# Patient Record
Sex: MALE | Race: WHITE | NOT HISPANIC OR LATINO | URBAN - METROPOLITAN AREA
[De-identification: names, ages, dates, MRNs, and addresses within clinical notes are randomized per-mention and may not be internally consistent; named-entity substitution may affect disease eponyms.]

---

## 2020-09-17 ENCOUNTER — LAB OUTSIDE AN ENCOUNTER (OUTPATIENT)
Dept: URBAN - METROPOLITAN AREA CLINIC 6 | Facility: CLINIC | Age: 55
End: 2020-09-17

## 2020-09-17 ENCOUNTER — TELEPHONE ENCOUNTER (OUTPATIENT)
Dept: URBAN - METROPOLITAN AREA CLINIC 6 | Facility: CLINIC | Age: 55
End: 2020-09-17

## 2020-09-23 ENCOUNTER — OFFICE VISIT (OUTPATIENT)
Dept: URBAN - METROPOLITAN AREA TELEHEALTH 2 | Facility: TELEHEALTH | Age: 55
End: 2020-09-23

## 2020-10-06 ENCOUNTER — TELEPHONE ENCOUNTER (OUTPATIENT)
Dept: URBAN - METROPOLITAN AREA CLINIC 92 | Facility: CLINIC | Age: 55
End: 2020-10-06

## 2020-10-06 NOTE — HPI-TODAY'S VISIT:
Dear   Carlos Mario, Labs: 10-3 labs: wbc 6.7 hg 16.3 and plat 179.  Glu 191 elevated and maybe not fasting and show local providers.cr 0.89.  na 139. K 4.4 and cl 103 and co2 22.  Ca 9.5. alb 4.8 and tb 0.9 and alk 85 and ast 32 and alt 62 and iron sat 41% and ferritin 269.  Prior 2019 labs ast 27 and alt 51.  We look forward to visit to getting update on you as been a while since that last visit.  Thank you.  Dr Szymanski

## 2020-10-13 ENCOUNTER — TELEPHONE ENCOUNTER (OUTPATIENT)
Dept: URBAN - METROPOLITAN AREA CLINIC 92 | Facility: CLINIC | Age: 55
End: 2020-10-13

## 2020-10-18 ENCOUNTER — TELEPHONE ENCOUNTER (OUTPATIENT)
Dept: URBAN - METROPOLITAN AREA CLINIC 92 | Facility: CLINIC | Age: 55
End: 2020-10-18

## 2020-10-18 NOTE — HPI-TODAY'S VISIT:
The patient is a 55 year old /White male, who presents afte rlast visit Jan 2019 on referral from Carlos Villarreal MD, for a gastroenterology evaluation of cirrhosis complicatd by portal hypertension and splenomegaly . A copy of this document will be sent to the referring provider. He is c/o LUQ pain that is constant starting a month ago that goes up to 9 on the pain scale. He has a history of kidney stones and was evaluated for this and it showed stones but was negative for obstruction. It is aggravated when he moves, coughs or sneeze. 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  The patient relates no significant family or personal history of liver disease. He states no history of new medications or alcohol use. The patient reports a personal history of no other habits of significance.  Interval testing includes: CMP,CBC.     Labs 01/22/2019 :glu 119, creat 0.84, na 137, k 4.1, alb 4.8, carlos 2.2, t. bili 1.1, ALP 74, AST 27, ALT 51 (< 30),  wbc 7.3, hgb 16.9, plts 206,000  Labs 07/08/2018: ALT 28, AST 18, ALP 72,, t. bili 1.0  He had his last phlebotomy a month ago. This is arranged by Dr. Garcia. He will follow up in Feb/Mar 2019  CT scan 01/17/2019: there are nonobstructing left renal parenchymal calculi with kidneys otherwise unremarkable, the appearance of the liver suggests diffuse fatty infiltration without focal lesion, spleen 18 x 12.0 x 7.5cm and is otherwise unrematkable, pancreas unremarkable  Encounter info: [id number], [facility], Single Visit OP, 11/3/2018 - 11/3/2018  * Final Report *  Reason For Exam *histo* abnormal liver  REPORT EXAM: MRI Abdomen w/ + w/o Contrast  CLINICAL INDICATION: *histo* abnormal liver.  TECHNIQUE: Multisequence, multiplanar MRI of the abdomen was performed without and with intravenous contrast. Written informed consent was obtained for the use of intravenous contrast.  CONTRAST: 23 cc of Prohance  COMPARISON: MR abdomen 3/10/2018  FINDINGS:  Lower Thorax: Limited images through the lung bases are unremarkable.  Liver: Morphologic changes of chronic liver disease with relative hypertrophy of the left hepatic lobe and mild surface nodularity, similar to prior. Computed fat percentage in the liver of 10.9%. No suspicious arterially enhancing lesion in the liver with washout to indicate hepatocellular carcinoma.  Gallbladder/Biliary Tree: Gallbladder not identified. No biliary ductal dilatation.  Spleen: Spleen measures 19.5 cm in length and is therefore enlarged, similar to prior.  Pancreas: No dilatation of the main pancreatic duct. Pancreatic parenchymal signal intensity is within normal limits. No suspicious focal lesion in the pancreas.  Adrenal Glands: Normal.   Kidneys/Ureters: No hydronephrosis bilaterally. No suspicious focal lesion in either kidney. The patient has a few small renal cysts measuring for example 1.0 cm upper pole right kidney series 9 image 34 and measuring 0.5 cm interpolar left kidney series 26 image 51.  Gastrointestinal: No evidence of bowel obstruction in the abdomen. Pelvic bowel loops not fully included in the field-of-view.   Lymph Nodes: No concerning lymph nodes are seen.  Vessels: Celiac trunk and superior mesenteric artery are patent. Main portal vein is patent. Mildly prominent collateral vessels are identified in the upper abdomen anteriorly.  Peritoneum/Retroperitoneum: No free fluid.  Bones/Soft Tissues: No concerning osseous lesions are seen.  IMPRESSION:      1. Similar to prior morphologic changes of chronic liver disease. No findings of hepatocellular cancer. Recommend continued 6 month MR surveillance.  2. Similar to prior sequelae of portal venous hypertension including 19 cm splenomegaly. Main portal vein is patent. No intra-abdominal ascites.   Signature Line *** Final ***  Electronically Signed By:  Mena Burnette on  11/04/2018 11:02  Dr. Szymanski reviewed his CT scan MRI and labs. He is concerned that there is an increase in the ALT. He is not sure if there is an increase of the ferritin causing it or if this is due to weight gain. He wants him to have the ferritin checked today and have a MRI to asssess the spleen and the LUQ pain. He is up 4 pounds since his last visit.  Duration of the visit was min office visit with > 50% of the time spent on coordination of care

## 2020-10-22 ENCOUNTER — OFFICE VISIT (OUTPATIENT)
Dept: URBAN - METROPOLITAN AREA CLINIC 91 | Facility: CLINIC | Age: 55
End: 2020-10-22
Payer: COMMERCIAL

## 2020-10-22 DIAGNOSIS — K75.81 NASH WITH CIRRHOSIS: ICD-10-CM

## 2020-10-22 PROCEDURE — 93975 VASCULAR STUDY: CPT

## 2020-10-22 PROCEDURE — 76705 ECHO EXAM OF ABDOMEN: CPT

## 2020-10-22 RX ORDER — IBUPROFEN 100 MG/1
TAKE 2 TABLETS (200 MG) BY ORAL ROUTE EVERY 6 HOURS AS NEEDED WITH FOOD TABLET, COATED ORAL
Qty: 0 | Refills: 0 | Status: ACTIVE | COMMUNITY
Start: 1900-01-01 | End: 1900-01-01

## 2020-10-22 RX ORDER — GABAPENTIN 300 MG/1
TAKE 1 CAPSULE (300 MG) BY ORAL ROUTE 3 TIMES PER DAY CAPSULE ORAL
Qty: 0 | Refills: 0 | Status: ACTIVE | COMMUNITY
Start: 1900-01-01 | End: 1900-01-01

## 2020-10-22 RX ORDER — METFORMIN HYDROCHLORIDE 1000 MG/1
TAKE 1 TABLET (1,000 MG) BY ORAL ROUTE 2 TIMES PER DAY WITH MORNING AND EVENING MEALS TABLET, COATED ORAL 2
Qty: 0 | Refills: 0 | Status: ACTIVE | COMMUNITY
Start: 1900-01-01 | End: 1900-01-01

## 2020-10-23 ENCOUNTER — TELEPHONE ENCOUNTER (OUTPATIENT)
Dept: URBAN - METROPOLITAN AREA CLINIC 92 | Facility: CLINIC | Age: 55
End: 2020-10-23

## 2020-10-23 NOTE — HPI-OTHER HISTORIES
Dear Mr Mario,  Oct u/s: changes of cirrhosis, and stigmata of portal hypertension seen. No focal mass in liver. Liver appears to also be fatty to them possibly. Doppler of vessels patent. Partialy visualized pancreas unremarkable.  No bile duct dilation. Gallbladder absent and common bile duct normal at 5mm. Right kidney 11cm with no hydronephrosis. Spleen enlarged 16.3cm. No ascites/fluid. Prior 2019 scan liver fatty and nodular and spleen 19.1cm. Liver and renal cysts seen better on mri than u.s.  Will  review with you at visit and discuss options.   Thanks  Dr Szymanski

## 2020-10-28 ENCOUNTER — OFFICE VISIT (OUTPATIENT)
Dept: URBAN - METROPOLITAN AREA TELEHEALTH 2 | Facility: TELEHEALTH | Age: 55
End: 2020-10-28

## 2020-12-18 ENCOUNTER — OFFICE VISIT (OUTPATIENT)
Dept: URBAN - METROPOLITAN AREA TELEHEALTH 2 | Facility: TELEHEALTH | Age: 55
End: 2020-12-18
Payer: COMMERCIAL

## 2020-12-18 ENCOUNTER — LAB OUTSIDE AN ENCOUNTER (OUTPATIENT)
Dept: URBAN - METROPOLITAN AREA TELEHEALTH 2 | Facility: TELEHEALTH | Age: 55
End: 2020-12-18

## 2020-12-18 DIAGNOSIS — Z71.6 TOBACCO ABUSE COUNSELING: ICD-10-CM

## 2020-12-18 DIAGNOSIS — K75.81 NASH (NONALCOHOLIC STEATOHEPATITIS): ICD-10-CM

## 2020-12-18 DIAGNOSIS — R16.0 HEPATOMEGALY: ICD-10-CM

## 2020-12-18 DIAGNOSIS — E66.3 OVERWEIGHT: ICD-10-CM

## 2020-12-18 PROCEDURE — 99214 OFFICE O/P EST MOD 30 MIN: CPT

## 2020-12-18 PROCEDURE — G9906 PT RECV TBCO CESS INTERV: HCPCS

## 2020-12-18 PROCEDURE — G9902 PT SCRN TBCO AND ID AS USER: HCPCS

## 2020-12-18 PROCEDURE — 3017F COLORECTAL CA SCREEN DOC REV: CPT

## 2020-12-18 PROCEDURE — G8427 DOCREV CUR MEDS BY ELIG CLIN: HCPCS

## 2020-12-18 PROCEDURE — G8482 FLU IMMUNIZE ORDER/ADMIN: HCPCS

## 2020-12-18 PROCEDURE — G8417 CALC BMI ABV UP PARAM F/U: HCPCS

## 2020-12-18 PROCEDURE — 4004F PT TOBACCO SCREEN RCVD TLK: CPT

## 2020-12-18 RX ORDER — GABAPENTIN 300 MG/1
TAKE 1 CAPSULE (300 MG) BY ORAL ROUTE 3 TIMES PER DAY CAPSULE ORAL ONCE A DAY
Refills: 0 | Status: ACTIVE | COMMUNITY
Start: 1900-01-01

## 2020-12-18 RX ORDER — IBUPROFEN 100 MG/1
TAKE 2 TABLETS (200 MG) BY ORAL ROUTE EVERY 6 HOURS AS NEEDED WITH FOOD TABLET, COATED ORAL
Qty: 0 | Refills: 0 | Status: DISCONTINUED | COMMUNITY
Start: 1900-01-01

## 2020-12-18 RX ORDER — METFORMIN HYDROCHLORIDE 1000 MG/1
TAKE 1 TABLET (1,000 MG) BY ORAL ROUTE 2 TIMES PER DAY WITH MORNING AND EVENING MEALS TABLET, COATED ORAL 2
Qty: 0 | Refills: 0 | Status: ACTIVE | COMMUNITY
Start: 1900-01-01

## 2020-12-18 NOTE — HPI-TODAY'S VISIT:
The patient is a 55 year old /White male, who presents after last visit since 2019 on referral from Carlos Villarreal MD, for a gastroenterology evaluation of cirrhosis complicatd by portal hypertension and splenomegaly.   A copy of this document will be sent to the referring provider.  Prior maira a hx of LUQ pain that is constant starting in the past that goes up to 9 on the pain scale and that has gone away and not clear and he is a  and may have pulled a muscles.  He has a history of kidney stones and that has not been an issue an none x 3-4 yrs.  Oct 2020 u/s: changes of cirrhosis, and stigmata of portal hypertension seen. No focal mass in liver. Liver appears to also be fatty to them possibly. Doppler of vessels patent. Partialy visualized pancreas unremarkable.  No bile duct dilation. Gallbladder absent and common bile duct normal at 5mm. Right kidney 11cm with no hydronephrosis. Spleen enlarged 16.3cm. No ascites/fluid. Prior 2019 scan liver fatty and nodular and spleen 19.1cm. Liver and renal cysts seen better on mri than u.s.    Scans every 6m to look for cancer.   Labs: 10-3 2020  labs: wbc 6.7 hg 16.3 and plat 179.  Glu 191 elevated and maybe not fasting and show local providers.cr 0.89.  na 139. K 4.4 and cl 103 and co2 22.  Ca 9.5. alb 4.8 and tb 0.9 and alk 85 and ast 32 and alt 62  (ideal alt <35)  and iron sat 41% and ferritin 269.   Prior 2019 labs ast 27 and alt 51.   He is having sugar issues and he says that has been tough with the pandemic.  1/22/2019 :glu 119, creat 0.84, na 137, k 4.1, alb 4.8, carlos 2.2, t. bili 1.1, ALP 74, AST 27, ALT 51 (< 30),  wbc 7.3, hgb 16.9, plts 206,000  7/08/2018: ALT 28, AST 18, ALP 72, t. bili 1.0  he has gained some weight 5-10 pounds.  He had his last phlebotomy  about every 6 months or so.  I called Dr Garcia when the mri issue came up.  Dr. Garcia. He will follow up in Feb/Mar 2019.  CT scan 01/17/2019: there are nonobstructing left renal parenchymal calculi with kidneys otherwise unremarkable, the appearance of the liver suggests diffuse fatty infiltration without focal lesion, spleen 18 x 12.0 x 7.5cm and is otherwise unrematkable, pancreas unremarkable  Encounter info: [id number], [facility], Single Visit OP, 11/3/2018 - 11/3/2018  * Final Report *  Reason For Exam *histo* abnormal liver  REPORT EXAM: MRI Abdomen w/ + w/o Contrast  CLINICAL INDICATION: *histo* abnormal liver.  TECHNIQUE: Multisequence, multiplanar MRI of the abdomen was performed without and with intravenous contrast. Written informed consent was obtained for the use of intravenous contrast.  CONTRAST: 23 cc of Prohance  COMPARISON: MR abdomen 3/10/2018  FINDINGS:  Lower Thorax: Limited images through the lung bases are unremarkable.  Liver: Morphologic changes of chronic liver disease with relative hypertrophy of the left hepatic lobe and mild surface nodularity, similar to prior. Computed fat percentage in the liver of 10.9%. No suspicious arterially enhancing lesion in the liver with washout to indicate hepatocellular carcinoma.  Gallbladder/Biliary Tree: Gallbladder not identified. No biliary ductal dilatation.  Spleen: Spleen measures 19.5 cm in length and is therefore enlarged, similar to prior.  Pancreas: No dilatation of the main pancreatic duct. Pancreatic parenchymal signal intensity is within normal limits. No suspicious focal lesion in the pancreas.  Adrenal Glands: Normal.   Kidneys/Ureters: No hydronephrosis bilaterally. No suspicious focal lesion in either kidney. The patient has a few small renal cysts measuring for example 1.0 cm upper pole right kidney series 9 image 34 and measuring 0.5 cm interpolar left kidney series 26 image 51.  Gastrointestinal: No evidence of bowel obstruction in the abdomen. Pelvic bowel loops not fully included in the field-of-view.   Lymph Nodes: No concerning lymph nodes are seen.  Vessels: Celiac trunk and superior mesenteric artery are patent. Main portal vein is patent. Mildly prominent collateral vessels are identified in the upper abdomen anteriorly.  Peritoneum/Retroperitoneum: No free fluid.  Bones/Soft Tissues: No concerning osseous lesions are seen.  IMPRESSION:      1. Similar to prior morphologic changes of chronic liver disease. No findings of hepatocellular cancer. Recommend continued 6 month MR surveillance.  2. Similar to prior sequelae of portal venous hypertension including 19 cm splenomegaly. Main portal vein is patent. No intra-abdominal ascites.   Signature Line *** Final ***  Electronically Signed By:  Mena Burnette on  11/04/2018 11:02  Plan: 1. Have him work on sugars and weight, 2. Resee us in May and do the labs and the u,s in April. 3. He lives close to [place]  and do the labs and the u.s there.  Stressed to pt the need for social distancing and strict handwashing and wearing a mask and to follow any other new or added CDC recommendations as this is an evolving target.  Duration of the visit was 33 min via [employer] for this office visit with greater than 50% of the time spent on coordination of care  with the pt.

## 2020-12-18 NOTE — EXAM-PHYSICAL EXAM
Telemed self reported:  Gen: no distress. Eyes: no jaundice. Mouth: no thrush. CV: no chest pain. Resp: no wheezes. Abd: no pain. Ext: no edema.	 Neuro: no weakness.

## 2021-01-04 ENCOUNTER — OFFICE VISIT (OUTPATIENT)
Dept: URBAN - METROPOLITAN AREA TELEHEALTH 2 | Facility: TELEHEALTH | Age: 56
End: 2021-01-04

## 2021-01-04 NOTE — HPI-TODAY'S VISIT:
The patient is a 55 year old /White male, who presents after last visit dec 2020  on referral from Carlos Villarreal MD, for a gastroenterology evaluation of cirrhosis complicatd by portal hypertension and splenomegaly.   A copy of this document will be sent to the referring provider.  Prior maira a hx of LUQ pain that is constant starting in the past that goes up to 9 on the pain scale and that has gone away and not clear and he is a  and may have pulled a muscles.  He has a history of kidney stones and that has not been an issue an none x 3-4 yrs.  Oct 2020 u/s: changes of cirrhosis, and stigmata of portal hypertension seen. No focal mass in liver. Liver appears to also be fatty to them possibly. Doppler of vessels patent. Partialy visualized pancreas unremarkable.  No bile duct dilation. Gallbladder absent and common bile duct normal at 5mm. Right kidney 11cm with no hydronephrosis. Spleen enlarged 16.3cm. No ascites/fluid. Prior 2019 scan liver fatty and nodular and spleen 19.1cm. Liver and renal cysts seen better on mri than u.s.    Scans every 6m to look for cancer.   Labs: 10-3 2020  labs: wbc 6.7 hg 16.3 and plat 179.  Glu 191 elevated and maybe not fasting and show local providers.cr 0.89.  na 139. K 4.4 and cl 103 and co2 22.  Ca 9.5. alb 4.8 and tb 0.9 and alk 85 and ast 32 and alt 62  (ideal alt <35)  and iron sat 41% and ferritin 269.   Prior 2019 labs ast 27 and alt 51.   He is having sugar issues and he says that has been tough with the pandemic.  1/22/2019 :glu 119, creat 0.84, na 137, k 4.1, alb 4.8, carlos 2.2, t. bili 1.1, ALP 74, AST 27, ALT 51 (< 30),  wbc 7.3, hgb 16.9, plts 206,000  7/08/2018: ALT 28, AST 18, ALP 72, t. bili 1.0  he has gained some weight 5-10 pounds.  He had his last phlebotomy  about every 6 months or so.  I called Dr Garcia when the mri issue came up.  Dr. Garcia. He will follow up in Feb/Mar 2019.  CT scan 01/17/2019: there are nonobstructing left renal parenchymal calculi with kidneys otherwise unremarkable, the appearance of the liver suggests diffuse fatty infiltration without focal lesion, spleen 18 x 12.0 x 7.5cm and is otherwise unrematkable, pancreas unremarkable  Encounter info: [id number], [facility], Single Visit OP, 11/3/2018 - 11/3/2018  * Final Report *  Reason For Exam *histo* abnormal liver  REPORT EXAM: MRI Abdomen w/ + w/o Contrast  CLINICAL INDICATION: *histo* abnormal liver.  TECHNIQUE: Multisequence, multiplanar MRI of the abdomen was performed without and with intravenous contrast. Written informed consent was obtained for the use of intravenous contrast.  CONTRAST: 23 cc of Prohance  COMPARISON: MR abdomen 3/10/2018  FINDINGS:  Lower Thorax: Limited images through the lung bases are unremarkable.  Liver: Morphologic changes of chronic liver disease with relative hypertrophy of the left hepatic lobe and mild surface nodularity, similar to prior. Computed fat percentage in the liver of 10.9%. No suspicious arterially enhancing lesion in the liver with washout to indicate hepatocellular carcinoma.  Gallbladder/Biliary Tree: Gallbladder not identified. No biliary ductal dilatation.  Spleen: Spleen measures 19.5 cm in length and is therefore enlarged, similar to prior.  Pancreas: No dilatation of the main pancreatic duct. Pancreatic parenchymal signal intensity is within normal limits. No suspicious focal lesion in the pancreas.  Adrenal Glands: Normal.   Kidneys/Ureters: No hydronephrosis bilaterally. No suspicious focal lesion in either kidney. The patient has a few small renal cysts measuring for example 1.0 cm upper pole right kidney series 9 image 34 and measuring 0.5 cm interpolar left kidney series 26 image 51.  Gastrointestinal: No evidence of bowel obstruction in the abdomen. Pelvic bowel loops not fully included in the field-of-view.   Lymph Nodes: No concerning lymph nodes are seen.  Vessels: Celiac trunk and superior mesenteric artery are patent. Main portal vein is patent. Mildly prominent collateral vessels are identified in the upper abdomen anteriorly.  Peritoneum/Retroperitoneum: No free fluid.  Bones/Soft Tissues: No concerning osseous lesions are seen.  IMPRESSION:      1. Similar to prior morphologic changes of chronic liver disease. No findings of hepatocellular cancer. Recommend continued 6 month MR surveillance.  2. Similar to prior sequelae of portal venous hypertension including 19 cm splenomegaly. Main portal vein is patent. No intra-abdominal ascites.   Signature Line *** Final ***  Electronically Signed By:  Mena Burnette on  11/04/2018 11:02  Plan: 1. Have him work on sugars and weight, 2. Resee us in May and do the labs and the u,s in April. 3. He lives close to [place]  and do the labs and the u.s there.  Stressed to pt the need for social distancing and strict handwashing and wearing a mask and to follow any other new or added CDC recommendations as this is an evolving target.  Duration of the visit was  min via [employer] for this office visit with greater than 50% of the time spent on coordination of care  with the pt.

## 2021-02-18 ENCOUNTER — TELEPHONE ENCOUNTER (OUTPATIENT)
Dept: URBAN - METROPOLITAN AREA CLINIC 6 | Facility: CLINIC | Age: 56
End: 2021-02-18

## 2021-03-16 ENCOUNTER — APPOINTMENT (RX ONLY)
Dept: URBAN - METROPOLITAN AREA CLINIC 38 | Facility: CLINIC | Age: 56
Setting detail: DERMATOLOGY
End: 2021-03-16

## 2021-03-16 ENCOUNTER — APPOINTMENT (RX ONLY)
Dept: URBAN - METROPOLITAN AREA CLINIC 37 | Facility: CLINIC | Age: 56
Setting detail: DERMATOLOGY
End: 2021-03-16

## 2021-03-16 DIAGNOSIS — R23.3 SPONTANEOUS ECCHYMOSES: ICD-10-CM

## 2021-03-16 DIAGNOSIS — L81.4 OTHER MELANIN HYPERPIGMENTATION: ICD-10-CM

## 2021-03-16 DIAGNOSIS — D22 MELANOCYTIC NEVI: ICD-10-CM

## 2021-03-16 DIAGNOSIS — L72.0 EPIDERMAL CYST: ICD-10-CM

## 2021-03-16 DIAGNOSIS — D18.0 HEMANGIOMA: ICD-10-CM

## 2021-03-16 DIAGNOSIS — L30.0 NUMMULAR DERMATITIS: ICD-10-CM

## 2021-03-16 PROBLEM — D22.5 MELANOCYTIC NEVI OF TRUNK: Status: ACTIVE | Noted: 2021-03-16

## 2021-03-16 PROBLEM — D18.01 HEMANGIOMA OF SKIN AND SUBCUTANEOUS TISSUE: Status: ACTIVE | Noted: 2021-03-16

## 2021-03-16 PROCEDURE — 99203 OFFICE O/P NEW LOW 30 MIN: CPT

## 2021-03-16 PROCEDURE — ? COUNSELING

## 2021-03-16 PROCEDURE — ? TREATMENT REGIMEN

## 2021-03-16 PROCEDURE — ? PRESCRIPTION

## 2021-03-16 RX ORDER — MOMETASONE FUROATE 1 MG/G
ONCE CREAM TOPICAL DAILY
Qty: 1 | Refills: 1 | Status: ERX | COMMUNITY
Start: 2021-03-16

## 2021-03-16 RX ADMIN — MOMETASONE FUROATE ONCE: 1 CREAM TOPICAL at 00:00

## 2021-03-16 ASSESSMENT — LOCATION DETAILED DESCRIPTION DERM
LOCATION DETAILED: LEFT CLAVICULAR NECK
LOCATION DETAILED: RIGHT LATERAL SUPERIOR CHEST
LOCATION DETAILED: RIGHT LATERAL SUPERIOR CHEST
LOCATION DETAILED: RIGHT VENTRAL PROXIMAL FOREARM
LOCATION DETAILED: RIGHT PROXIMAL PRETIBIAL REGION
LOCATION DETAILED: RIGHT SUPERIOR MEDIAL UPPER BACK
LOCATION DETAILED: LEFT PROXIMAL PRETIBIAL REGION
LOCATION DETAILED: RIGHT SUPERIOR MEDIAL UPPER BACK
LOCATION DETAILED: LEFT LATERAL ABDOMEN
LOCATION DETAILED: LEFT CLAVICULAR NECK
LOCATION DETAILED: LEFT VENTRAL PROXIMAL FOREARM
LOCATION DETAILED: LEFT VENTRAL PROXIMAL FOREARM
LOCATION DETAILED: RIGHT VENTRAL PROXIMAL FOREARM
LOCATION DETAILED: LEFT LATERAL ABDOMEN
LOCATION DETAILED: RIGHT PROXIMAL PRETIBIAL REGION
LOCATION DETAILED: LEFT PROXIMAL PRETIBIAL REGION

## 2021-03-16 ASSESSMENT — LOCATION SIMPLE DESCRIPTION DERM
LOCATION SIMPLE: LEFT ANTERIOR NECK
LOCATION SIMPLE: CHEST
LOCATION SIMPLE: LEFT PRETIBIAL REGION
LOCATION SIMPLE: RIGHT FOREARM
LOCATION SIMPLE: RIGHT UPPER BACK
LOCATION SIMPLE: LEFT PRETIBIAL REGION
LOCATION SIMPLE: ABDOMEN
LOCATION SIMPLE: LEFT FOREARM
LOCATION SIMPLE: LEFT FOREARM
LOCATION SIMPLE: ABDOMEN
LOCATION SIMPLE: RIGHT PRETIBIAL REGION
LOCATION SIMPLE: LEFT ANTERIOR NECK
LOCATION SIMPLE: RIGHT UPPER BACK
LOCATION SIMPLE: CHEST
LOCATION SIMPLE: RIGHT PRETIBIAL REGION
LOCATION SIMPLE: RIGHT FOREARM

## 2021-03-16 ASSESSMENT — LOCATION ZONE DERM
LOCATION ZONE: ARM
LOCATION ZONE: TRUNK
LOCATION ZONE: LEG
LOCATION ZONE: ARM
LOCATION ZONE: LEG
LOCATION ZONE: NECK
LOCATION ZONE: TRUNK
LOCATION ZONE: NECK

## 2021-03-16 NOTE — PROCEDURE: TREATMENT REGIMEN
Samples Given: Eucerin advanced repair lotion
Plan: Discussed with pt he should be seeing results with a couple of weeks of treatment with the MOMETASONE cream, advised pt  to moisturize daily with eucerin lotion
Initiate Treatment: MOMETASONE cream apply once nightly
Detail Level: Generalized
Otc Regimen: Eucerin advanced repair cream

## 2021-03-16 NOTE — PROCEDURE: TREATMENT REGIMEN
Otc Regimen: Eucerin advanced repair cream
Detail Level: Generalized
Plan: Discussed with pt he should be seeing results with a couple of weeks of treatment with the MOMETASONE cream, advised pt  to moisturize daily with eucerin lotion
Samples Given: Eucerin advanced repair lotion
Initiate Treatment: MOMETASONE cream apply once nightly

## 2021-04-01 ENCOUNTER — LAB OUTSIDE AN ENCOUNTER (OUTPATIENT)
Dept: URBAN - METROPOLITAN AREA TELEHEALTH 2 | Facility: TELEHEALTH | Age: 56
End: 2021-04-01

## 2021-04-03 ENCOUNTER — TELEPHONE ENCOUNTER (OUTPATIENT)
Dept: URBAN - METROPOLITAN AREA CLINIC 92 | Facility: CLINIC | Age: 56
End: 2021-04-03

## 2021-04-03 LAB
A/G RATIO: 1.7
ALBUMIN: 4.5
ALKALINE PHOSPHATASE: 90
ALT (SGPT): 46
AST (SGOT): 24
BASO (ABSOLUTE): 0.1
BASOS: 1
BILIRUBIN, TOTAL: 1
BUN/CREATININE RATIO: 14
BUN: 13
CALCIUM: 10.2
CARBON DIOXIDE, TOTAL: 22
CHLORIDE: 103
CREATININE: 0.96
EGFR IF AFRICN AM: 101
EGFR IF NONAFRICN AM: 87
EOS (ABSOLUTE): 0.4
EOS: 4
FERRITIN, SERUM: 186
GLOBULIN, TOTAL: 2.6
GLUCOSE: 135
HEMATOCRIT: 45.5
HEMATOLOGY COMMENTS:: (no result)
HEMOGLOBIN: 15.6
IMMATURE CELLS: (no result)
IMMATURE GRANS (ABS): 0
IMMATURE GRANULOCYTES: 0
INR: 1.1
IRON BIND.CAP.(TIBC): 252
IRON SATURATION: 38
IRON: 96
LYMPHS (ABSOLUTE): 1.7
LYMPHS: 18
MCH: 32.4
MCHC: 34.3
MCV: 95
MONOCYTES(ABSOLUTE): 0.6
MONOCYTES: 7
NEUTROPHILS (ABSOLUTE): 6.4
NEUTROPHILS: 70
NRBC: (no result)
PLATELETS: 202
POTASSIUM: 4
PROTEIN, TOTAL: 7.1
PROTHROMBIN TIME: 11.6
RBC: 4.81
RDW: 12.8
SODIUM: 140
UIBC: 156
WBC: 9.2

## 2021-04-03 NOTE — HPI-TODAY'S VISIT:
April 2 labs showed INR 1.1 iron saturation normal at 38%.  Ferritin 186. Both down from prior labs. Sodium 140 potassium 4.0 chloride 103 CO2 22 albumin 4.5 bilirubin 1.0 alkaline phosphatase 90 AST 24 and ALT 46.  Ideal ALT would be less than 35.  Glucose elevated 135 and show to primary MD.  BUN 13.  Creatinine 0.96.  White blood cell count 9.2 hemoglobin 15.6 platelet count 202 and MCV 95 neutrophils noted to be normal at 6.4.  Per notes from last visit October 2020 labs showed hemoglobin 16.3 platelet count 179 AST 32 and ALT 62 iron sat was 41% and ferritin 269.  Suspect he may have had a phlebotomy done since that last time frame. Meld 7 and meld na 7. Please share labs with local providers.

## 2021-04-08 ENCOUNTER — OFFICE VISIT (OUTPATIENT)
Dept: URBAN - METROPOLITAN AREA CLINIC 91 | Facility: CLINIC | Age: 56
End: 2021-04-08
Payer: COMMERCIAL

## 2021-04-08 DIAGNOSIS — R16.1 SPLENOMEGALY: ICD-10-CM

## 2021-04-08 DIAGNOSIS — K76.0 FATTY LIVER: ICD-10-CM

## 2021-04-08 PROCEDURE — 76705 ECHO EXAM OF ABDOMEN: CPT

## 2021-04-08 PROCEDURE — 93975 VASCULAR STUDY: CPT

## 2021-04-08 RX ORDER — METFORMIN HYDROCHLORIDE 1000 MG/1
TAKE 1 TABLET (1,000 MG) BY ORAL ROUTE 2 TIMES PER DAY WITH MORNING AND EVENING MEALS TABLET, COATED ORAL 2
Qty: 0 | Refills: 0 | Status: ACTIVE | COMMUNITY
Start: 1900-01-01

## 2021-04-08 RX ORDER — GABAPENTIN 300 MG/1
TAKE 1 CAPSULE (300 MG) BY ORAL ROUTE 3 TIMES PER DAY CAPSULE ORAL ONCE A DAY
Refills: 0 | Status: ACTIVE | COMMUNITY
Start: 1900-01-01

## 2021-04-10 ENCOUNTER — TELEPHONE ENCOUNTER (OUTPATIENT)
Dept: URBAN - METROPOLITAN AREA CLINIC 92 | Facility: CLINIC | Age: 56
End: 2021-04-10

## 2021-04-10 NOTE — HPI-TODAY'S VISIT:
Jing Mario, April 8 ultrasound shows nonvisualization of the gallbladder consistent with prior surgery.  Common bile duct normal at 4 mm.  Liver did appear to be diffusely increased in echogenicity and consistent with a possible fatty infiltration.  No focal abnormality was seen.  Imaged pancreas was unremarkable.  Right kidney 11.5 cm with no hydronephrosis.  Spleen was enlarged at 15 cm.  Liver vessels were patent.  Dr Szymanski

## 2021-04-19 ENCOUNTER — LAB OUTSIDE AN ENCOUNTER (OUTPATIENT)
Dept: URBAN - METROPOLITAN AREA TELEHEALTH 2 | Facility: TELEHEALTH | Age: 56
End: 2021-04-19

## 2021-04-19 ENCOUNTER — OFFICE VISIT (OUTPATIENT)
Dept: URBAN - METROPOLITAN AREA TELEHEALTH 2 | Facility: TELEHEALTH | Age: 56
End: 2021-04-19
Payer: COMMERCIAL

## 2021-04-19 DIAGNOSIS — E83.110 HEREDITARY HEMOCHROMATOSIS: ICD-10-CM

## 2021-04-19 DIAGNOSIS — R93.2 ABNORMAL LIVER DIAGNOSTIC IMAGING: ICD-10-CM

## 2021-04-19 DIAGNOSIS — K74.69 OTHER CIRRHOSIS OF LIVER: ICD-10-CM

## 2021-04-19 DIAGNOSIS — K75.81 NASH (NONALCOHOLIC STEATOHEPATITIS): ICD-10-CM

## 2021-04-19 PROCEDURE — 99214 OFFICE O/P EST MOD 30 MIN: CPT

## 2021-04-19 RX ORDER — METFORMIN HYDROCHLORIDE 1000 MG/1
TAKE 1 TABLET (1,000 MG) BY ORAL ROUTE 2 TIMES PER DAY WITH MORNING AND EVENING MEALS TABLET, COATED ORAL 2
Qty: 0 | Refills: 0 | Status: ACTIVE | COMMUNITY
Start: 1900-01-01

## 2021-04-19 RX ORDER — GABAPENTIN 300 MG/1
TAKE 1 CAPSULE (300 MG) BY ORAL ROUTE 3 TIMES PER DAY CAPSULE ORAL ONCE A DAY
Refills: 0 | Status: ACTIVE | COMMUNITY
Start: 1900-01-01

## 2021-04-19 NOTE — HPI-TODAY'S VISIT:
The patient is a 55 year old /White male, who presents after last visit dec 2020  on referral from Carlos Villarreal MD, after prior visit jan 2021 for a gastroenterology evaluation of cirrhosis complicatd by portal hypertension and splenomegaly.   A copy of this document will be sent to the referring provider.  They little outside [place] and they were ok with the storms.  April 8 2021 ultrasound shows nonvisualization of the gallbladder consistent with prior surgery.  Common bile duct normal at 4 mm.  Liver did appear to be diffusely increased in echogenicity and consistent with a possible fatty infiltration.  No focal abnormality was seen.  Imaged pancreas was unremarkable.  Right kidney 11.5 cm with no hydronephrosis.  Spleen was enlarged at 15 cm.  Liver vessels were patent.  April 2 labs showed INR 1.1 iron saturation normal at 38%.  Ferritin 186. Both down from prior labs. Sodium 140 potassium 4.0 chloride 103 CO2 22 albumin 4.5 bilirubin 1.0 alkaline phosphatase 90 AST 24 and ALT 46.  Ideal ALT would be less than 35.  Glucose elevated 135 and show to primary MD.  BUN 13.  Creatinine 0.96.  White blood cell count 9.2 hemoglobin 15.6 platelet count 202 and MCV 95 neutrophils noted to be normal at 6.4.  Per notes from last visit October 2020 labs showed hemoglobin 16.3 platelet count 179 AST 32 and ALT 62 iron sat was 41% and ferritin 269.  Suspect he may have had a phlebotomy done since that last time frame. Meld 7 and meld na 7.   Prior maira a hx of LUQ pain that was prior a constant pain and this has been gone now doing mor exercises and nearly quit smoking.   He is a  and may have pulled a muscles and lot of issues due to that and so deal with that.  He has a history of kidney stones and that has not been an issue an none x 3-4 yrs.  Oct 2020 u/s: changes of cirrhosis, and stigmata of portal hypertension seen. No focal mass in liver. Liver appears to also be fatty to them possibly. Doppler of vessels patent. Partialy visualized pancreas unremarkable.  No bile duct dilation. Gallbladder absent and common bile duct normal at 5mm. Right kidney 11cm with no hydronephrosis. Spleen enlarged 16.3cm. No ascites/fluid.  Prior 2019 scan liver fatty and nodular and spleen 19.1cm. Liver and renal cysts seen better on mri than u.s.    Scans every 6m to look for cancer.  He has been losing weight and trying to eat better. 4-5 pounds.   Labs: 10-3 2020  labs: wbc 6.7 hg 16.3 and plat 179.  Glu 191 elevated and maybe not fasting and show local providers.cr 0.89.  na 139. K 4.4 and cl 103 and co2 22.  Ca 9.5. alb 4.8 and tb 0.9 and alk 85 and ast 32 and alt 62  (ideal alt less than35)  and iron sat 41% and ferritin 269.   Prior 2019 labs ast 27 and alt 51.   He is having sugar issues and he says that has been tough with the pandemic.  1/22/2019 :glu 119, creat 0.84, na 137, k 4.1, alb 4.8, carlos 2.2, t. bili 1.1, ALP 74, AST 27, ALT 51 (less than 30),  wbc 7.3, hgb 16.9, plts 206,000  7/08/2018: ALT 28, AST 18, ALP 72, t. bili 1.0  He had gained some weight post 2018. On strict diet post gb surgery and lost 15-20 pounds and down in 205.  He had his last phlebotomy  about every 6 months or so.  Dr Garcia when the mri issue came up. He had no reasons to do mri in addition.  CT scan 01/17/2019: there are nonobstructing left renal parenchymal calculi with kidneys otherwise unremarkable, the appearance of the liver suggests diffuse fatty infiltration without focal lesion, spleen 18 x 12.0 x 7.5cm and is otherwise unrematkable, pancreas unremarkable  Encounter info: [id number], [facility], Single Visit OP, 11/3/2018 - 11/3/2018  * Final Report *  Reason For Exam *histo* abnormal liver  REPORT EXAM: MRI Abdomen w/ + w/o Contrast  CLINICAL INDICATION: *histo* abnormal liver.  TECHNIQUE: Multisequence, multiplanar MRI of the abdomen was performed without and with intravenous contrast. Written informed consent was obtained for the use of intravenous contrast.  CONTRAST: 23 cc of Prohance  COMPARISON: MR abdomen 3/10/2018  FINDINGS:  Lower Thorax: Limited images through the lung bases are unremarkable.  Liver: Morphologic changes of chronic liver disease with relative hypertrophy of the left hepatic lobe and mild surface nodularity, similar to prior. Computed fat percentage in the liver of 10.9%. No suspicious arterially enhancing lesion in the liver with washout to indicate hepatocellular carcinoma.  Gallbladder/Biliary Tree: Gallbladder not identified. No biliary ductal dilatation.  Spleen: Spleen measures 19.5 cm in length and is therefore enlarged, similar to prior.  Pancreas: No dilatation of the main pancreatic duct. Pancreatic parenchymal signal intensity is within normal limits. No suspicious focal lesion in the pancreas.  Adrenal Glands: Normal.   Kidneys/Ureters: No hydronephrosis bilaterally. No suspicious focal lesion in either kidney. The patient has a few small renal cysts measuring for example 1.0 cm upper pole right kidney series 9 image 34 and measuring 0.5 cm interpolar left kidney series 26 image 51.  Gastrointestinal: No evidence of bowel obstruction in the abdomen. Pelvic bowel loops not fully included in the field-of-view.   Lymph Nodes: No concerning lymph nodes are seen.  Vessels: Celiac trunk and superior mesenteric artery are patent. Main portal vein is patent. Mildly prominent collateral vessels are identified in the upper abdomen anteriorly.  Peritoneum/Retroperitoneum: No free fluid.  Bones/Soft Tissues: No concerning osseous lesions are seen.  IMPRESSION:      1. Similar to prior morphologic changes of chronic liver disease. No findings of hepatocellular cancer. Recommend continued 6 month MR surveillance.  2. Similar to prior sequelae of portal venous hypertension including 19 cm splenomegaly. Main portal vein is patent. No intra-abdominal ascites.   Signature Line *** Final ***  Electronically Signed By:  Mena Burnette on  11/04/2018 11:02  Plan: 1. Have him work on sugars and weight and exercise has helped. Seeing the spleen smaller and the cirrhosis less obvious. 2. Labs better and meld is low. 3. Redo the u.s in 6m. 4. Asked re egd and needs to get that done with Dr Valladares. 5. He lives close to [place]  and do there.  Stressed to pt the need for social distancing and strict handwashing and wearing a mask and to follow any other new or added CDC recommendations as this is an evolving target.  Duration of the visit was  30 min with 4 min of chart prep and 26 min via healow with the pt visit with greater than 50% of the time spent on coordination of care  with the pt.

## 2021-10-01 ENCOUNTER — LAB OUTSIDE AN ENCOUNTER (OUTPATIENT)
Dept: URBAN - METROPOLITAN AREA TELEHEALTH 2 | Facility: TELEHEALTH | Age: 56
End: 2021-10-01

## 2021-10-19 ENCOUNTER — OFFICE VISIT (OUTPATIENT)
Dept: URBAN - METROPOLITAN AREA CLINIC 91 | Facility: CLINIC | Age: 56
End: 2021-10-19

## 2021-10-19 PROBLEM — 35400008 HEREDITARY HEMOCHROMATOSIS: Status: ACTIVE | Noted: 2021-10-19

## 2021-10-19 RX ORDER — GABAPENTIN 300 MG/1
TAKE 1 CAPSULE (300 MG) BY ORAL ROUTE 3 TIMES PER DAY CAPSULE ORAL ONCE A DAY
Refills: 0 | Status: ACTIVE | COMMUNITY
Start: 1900-01-01

## 2021-10-19 RX ORDER — METFORMIN HYDROCHLORIDE 1000 MG/1
TAKE 1 TABLET (1,000 MG) BY ORAL ROUTE 2 TIMES PER DAY WITH MORNING AND EVENING MEALS TABLET, COATED ORAL 2
Qty: 0 | Refills: 0 | Status: ACTIVE | COMMUNITY
Start: 1900-01-01

## 2021-10-20 ENCOUNTER — TELEPHONE ENCOUNTER (OUTPATIENT)
Dept: URBAN - METROPOLITAN AREA CLINIC 92 | Facility: CLINIC | Age: 56
End: 2021-10-20

## 2021-10-20 LAB
A/G RATIO: 2
ALBUMIN: 4.7
ALKALINE PHOSPHATASE: 74
ALT (SGPT): 55
AST (SGOT): 25
BASO (ABSOLUTE): 0
BASOS: 1
BILIRUBIN, TOTAL: 1
BUN/CREATININE RATIO: 15
BUN: 14
CALCIUM: 9.8
CARBON DIOXIDE, TOTAL: 22
CHLORIDE: 100
CREATININE: 0.91
EGFR IF AFRICN AM: 107
EGFR IF NONAFRICN AM: 93
EOS (ABSOLUTE): 0.4
EOS: 6
FERRITIN, SERUM: 280
GLOBULIN, TOTAL: 2.3
GLUCOSE: 235
HEMATOCRIT: 43.8
HEMATOLOGY COMMENTS:: (no result)
HEMOGLOBIN: 15.3
IMMATURE CELLS: (no result)
IMMATURE GRANS (ABS): 0
IMMATURE GRANULOCYTES: 1
IRON BIND.CAP.(TIBC): 254
IRON SATURATION: 52
IRON: 131
LYMPHS (ABSOLUTE): 1.4
LYMPHS: 23
MCH: 32.7
MCHC: 34.9
MCV: 94
MONOCYTES(ABSOLUTE): 0.5
MONOCYTES: 8
NEUTROPHILS (ABSOLUTE): 3.9
NEUTROPHILS: 61
NRBC: (no result)
PLATELETS: 173
POTASSIUM: 4.4
PROTEIN, TOTAL: 7
RBC: 4.68
RDW: 11.6
SODIUM: 136
UIBC: 123
WBC: 6.2

## 2021-10-20 NOTE — HPI-TODAY'S VISIT:
Dear Carlos Mario, October 19 labs show white blood cell count 6.2 hemoglobin 15.3 platelet count 173 MCV 94 and  neutrophils 3.9 and lymphocytes 1.4.   BUN of 14 creatinine 0.91 sodium 136 potassium 4.4 calcium 9.8 albumin 4.7 bilirubin 1.0 alk phosphatase 74 AST 25 ALT 55.  Previously AST 24 ALT 46.  Ferritin up to 280 from 186.  Iron saturation up to 52% ferritin 38.  Still in the normal range.   Suspect that with the iron saturation creeping up that you are headed for phlebotomy soon. Please share with Dr Garcia. Glucose elevated at 235 up from 135.  You need to show to primary provider as sugar did go up a bit. Dr Szymanski

## 2021-10-27 ENCOUNTER — OFFICE VISIT (OUTPATIENT)
Dept: URBAN - METROPOLITAN AREA TELEHEALTH 2 | Facility: TELEHEALTH | Age: 56
End: 2021-10-27

## 2021-10-27 ENCOUNTER — OFFICE VISIT (OUTPATIENT)
Dept: URBAN - METROPOLITAN AREA TELEHEALTH 2 | Facility: TELEHEALTH | Age: 56
End: 2021-10-27
Payer: COMMERCIAL

## 2021-10-27 DIAGNOSIS — N62 GYNECOMASTIA: ICD-10-CM

## 2021-10-27 DIAGNOSIS — Z98.890 HISTORY OF COLONOSCOPY: ICD-10-CM

## 2021-10-27 DIAGNOSIS — R93.2 ABNORMAL LIVER DIAGNOSTIC IMAGING: ICD-10-CM

## 2021-10-27 DIAGNOSIS — R17 ELEVATED BILIRUBIN: ICD-10-CM

## 2021-10-27 DIAGNOSIS — K74.69 OTHER CIRRHOSIS OF LIVER: ICD-10-CM

## 2021-10-27 DIAGNOSIS — R16.0 HEPATOMEGALY: ICD-10-CM

## 2021-10-27 DIAGNOSIS — Z90.49 HISTORY OF CHOLECYSTECTOMY: ICD-10-CM

## 2021-10-27 DIAGNOSIS — E11.9 TYPE 2 DIABETES MELLITUS WITHOUT COMPLICATION, WITHOUT LONG-TERM CURRENT USE OF INSULIN: ICD-10-CM

## 2021-10-27 DIAGNOSIS — E66.3 OVERWEIGHT: ICD-10-CM

## 2021-10-27 DIAGNOSIS — E83.110 HEREDITARY HEMOCHROMATOSIS: ICD-10-CM

## 2021-10-27 DIAGNOSIS — K75.81 NASH (NONALCOHOLIC STEATOHEPATITIS): ICD-10-CM

## 2021-10-27 PROCEDURE — 99214 OFFICE O/P EST MOD 30 MIN: CPT

## 2021-10-27 RX ORDER — DULAGLUTIDE 0.75 MG/.5ML
AS DIRECTED INJECTION, SOLUTION SUBCUTANEOUS
Status: ACTIVE | COMMUNITY

## 2021-10-27 RX ORDER — METFORMIN HYDROCHLORIDE 1000 MG/1
TAKE 1 TABLET (1,000 MG) BY ORAL ROUTE 2 TIMES PER DAY WITH MORNING AND EVENING MEALS TABLET, COATED ORAL 2
Qty: 0 | Refills: 0 | Status: ACTIVE | COMMUNITY
Start: 1900-01-01

## 2021-10-27 RX ORDER — GABAPENTIN 300 MG/1
TAKE 1 CAPSULE (300 MG) BY ORAL ROUTE 3 TIMES PER DAY CAPSULE ORAL ONCE A DAY
Refills: 0 | Status: ACTIVE | COMMUNITY
Start: 1900-01-01

## 2021-10-27 NOTE — HPI-TODAY'S VISIT:
The patient is a 56 year old /White male, who presents after last visit April 2021 on a prior referral from Carlos Villarreal MD, for a gastroenterology evaluation of cirrhosis complicatd by portal hypertension and splenomegaly.   A copy of this document will be sent to the referring provider.  [place]ives [place] outside [place].  October 19 labs show white blood cell count 6.2 hemoglobin 15.3 platelet count 173 MCV 94 and  neutrophils 3.9 and lymphocytes 1.4.   BUN of 14 creatinine 0.91 sodium 136 potassium 4.4 calcium 9.8 albumin 4.7 bilirubin 1.0 alk phosphatase 74 AST 25 ALT 55.  Previously AST 24 ALT 46.  Ferritin up to 280 from 186.  Iron saturation up to 52% ferritin 38.  Still in the normal range.   He says he quit smoking for 3m and snacking and some weight gain about 5-7 pounds. Now doign better and working on this. Suspected that with the iron saturation went up and he will discuss with heme re this. Glucose elevated at 235 up from 135. he says he was not fasting and he says at physical and a1c and was up.  U.s was rescheduled nov 5,  April 8 2021 ultrasound shows nonvisualization of the gallbladder consistent with prior surgery.  Common bile duct normal at 4 mm.  Liver did appear to be diffusely increased in echogenicity and consistent with a possible fatty infiltration.  No focal abnormality was seen.  Imaged pancreas was unremarkable.  Right kidney 11.5 cm with no hydronephrosis.  Spleen was enlarged at 15 cm.  Liver vessels were patent.  Doing phlebotomy every 6m and so 2-3 months.  April 2 labs showed INR 1.1 iron saturation normal at 38%.  Ferritin 186. Both down from prior labs. Sodium 140 potassium 4.0 chloride 103 CO2 22 albumin 4.5 bilirubin 1.0 alkaline phosphatase 90 AST 24 and ALT 46.  Ideal ALT would be less than 35.  Glucose elevated 135 and show to primary MD.  BUN 13.  Creatinine 0.96.  White blood cell count 9.2 hemoglobin 15.6 platelet count 202 and MCV 95 neutrophils noted to be normal at 6.4.  Per notes from last visit October 2020 labs showed hemoglobin 16.3 platelet count 179 AST 32 and ALT 62 iron sat was 41% and ferritin 269.  Meld 7 and meld na 7.   Prior maira a hx of LUQ pain that was prior a constant pain and this has been gone.  He did monday covid 19 booster and arm sore from shot. He said little tired. He did the moderna.  He is a  and a sore arm affects this.  He has a history of kidney stones and that has not been an issue an none x 3-4 yrs.  Oct 2020 u/s: changes of cirrhosis, and stigmata of portal hypertension seen. No focal mass in liver. Liver appears to also be fatty to them possibly. Doppler of vessels patent. Partialy visualized pancreas unremarkable.  No bile duct dilation. Gallbladder absent and common bile duct normal at 5mm. Right kidney 11cm with no hydronephrosis. Spleen enlarged 16.3cm. No ascites/fluid.  Prior 2019 scan liver fatty and nodular and spleen 19.1cm. Liver and renal cysts seen better on mri than u.s.    Scans every 6m to look for cancer.  He says weight now is 5-7 pounds up from stopping weight.  10-3 2020  labs: wbc 6.7 hg 16.3 and plat 179.  Glu 191 elevated and maybe not fasting and show local providers.cr 0.89.  na 139. K 4.4 and cl 103 and co2 22.  Ca 9.5. alb 4.8 and tb 0.9 and alk 85 and ast 32 and alt 62  (ideal alt less than35)  and iron sat 41% and ferritin 269.   Prior 2019 labs ast 27 and alt 51.   He is having sugar issues and he says that has been tough with the pandemic.  1/22/2019 :glu 119, creat 0.84, na 137, k 4.1, alb 4.8, carlos 2.2, t. bili 1.1, ALP 74, AST 27, ALT 51 (less than 30),  wbc 7.3, hgb 16.9, plts 206,000  7/08/2018: ALT 28, AST 18, ALP 72, t. bili 1.0  He had gained some weight post 2018. On strict diet post gb surgery and lost 15-20 pounds and down in 205 after the gb and so that was lowest weight. usual 215.  Weight now 220 oct 2021.  Dr Garcia is to see him jan 2022.  CT scan 01/17/2019: there are nonobstructing left renal parenchymal calculi with kidneys otherwise unremarkable, the appearance of the liver suggests diffuse fatty infiltration without focal lesion, spleen 18 x 12.0 x 7.5cm and is otherwise unrematkable, pancreas unremarkable  Encounter info: [id number], [facility], Single Visit OP, 11/3/2018 - 11/3/2018  * Final Report *  Reason For Exam *histo* abnormal liver  REPORT EXAM: MRI Abdomen w/ + w/o Contrast  CLINICAL INDICATION: *histo* abnormal liver.  TECHNIQUE: Multisequence, multiplanar MRI of the abdomen was performed without and with intravenous contrast. Written informed consent was obtained for the use of intravenous contrast.  CONTRAST: 23 cc of Prohance  COMPARISON: MR abdomen 3/10/2018  FINDINGS:  Lower Thorax: Limited images through the lung bases are unremarkable.  Liver: Morphologic changes of chronic liver disease with relative hypertrophy of the left hepatic lobe and mild surface nodularity, similar to prior. Computed fat percentage in the liver of 10.9%. No suspicious arterially enhancing lesion in the liver with washout to indicate hepatocellular carcinoma.  Gallbladder/Biliary Tree: Gallbladder not identified. No biliary ductal dilatation.  Spleen: Spleen measures 19.5 cm in length and is therefore enlarged, similar to prior.  Pancreas: No dilatation of the main pancreatic duct. Pancreatic parenchymal signal intensity is within normal limits. No suspicious focal lesion in the pancreas.  Adrenal Glands: Normal.   Kidneys/Ureters: No hydronephrosis bilaterally. No suspicious focal lesion in either kidney. The patient has a few small renal cysts measuring for example 1.0 cm upper pole right kidney series 9 image 34 and measuring 0.5 cm interpolar left kidney series 26 image 51.  Gastrointestinal: No evidence of bowel obstruction in the abdomen. Pelvic bowel loops not fully included in the field-of-view.   Lymph Nodes: No concerning lymph nodes are seen.  Vessels: Celiac trunk and superior mesenteric artery are patent. Main portal vein is patent. Mildly prominent collateral vessels are identified in the upper abdomen anteriorly.  Peritoneum/Retroperitoneum: No free fluid.  Bones/Soft Tissues: No concerning osseous lesions are seen.  IMPRESSION:      1. Similar to prior morphologic changes of chronic liver disease. No findings of hepatocellular cancer. Recommend continued 6 month MR surveillance.  2. Similar to prior sequelae of portal venous hypertension including 19 cm splenomegaly. Main portal vein is patent. No intra-abdominal ascites.   Signature Line *** Final ***  Electronically Signed By:  Mena Burnette on  11/04/2018 11:02  Plan: 1. Have him work on sugars and weight gain and the exercise and get back on track. u.s is pending and nov 5.  2. Labs little up and follow.  3. Redo the u.s in 6m. 4. Asked re egd and he does with Dr Valladares and has not done yet. 5. He lives close to [place]  and can do there.  Stressed to pt the need for social distancing and strict handwashing and wearing a mask and to follow any other new or added CDC recommendations as this is an evolving target.  Duration of the visit was 30  min with 5 min of chart prep and 25 min from 1133 to 1158 am by clock as started  via doximity and then by phone due to poor audio for this  pt visit with greater than 50% of the time spent on coordination of care  with the pt.

## 2021-10-27 NOTE — HPI-TODAY'S VISIT:
The patient is a 55 year old /White male, who presents after last visit April 2021 on a prior referral from Carlos Villarreal MD, for a gastroenterology evaluation of cirrhosis complicatd by portal hypertension and splenomegaly.   A copy of this document will be sent to the referring provider.  They little outside [place] and they were ok with the storms.  Dear Carlos Mario, October 19 labs show white blood cell count 6.2 hemoglobin 15.3 platelet count 173 MCV 94 and  neutrophils 3.9 and lymphocytes 1.4.   BUN of 14 creatinine 0.91 sodium 136 potassium 4.4 calcium 9.8 albumin 4.7 bilirubin 1.0 alk phosphatase 74 AST 25 ALT 55.  Previously AST 24 ALT 46.  Ferritin up to 280 from 186.  Iron saturation up to 52% ferritin 38.  Still in the normal range.   Suspect that with the iron saturation creeping up that you are headed for phlebotomy soon. Please share with Dr Garcia. Glucose elevated at 235 up from 135.  You need to show to primary provider as sugar did go up a bit. Dr Szymanski   April 8 2021 ultrasound shows nonvisualization of the gallbladder consistent with prior surgery.  Common bile duct normal at 4 mm.  Liver did appear to be diffusely increased in echogenicity and consistent with a possible fatty infiltration.  No focal abnormality was seen.  Imaged pancreas was unremarkable.  Right kidney 11.5 cm with no hydronephrosis.  Spleen was enlarged at 15 cm.  Liver vessels were patent.  April 2 labs showed INR 1.1 iron saturation normal at 38%.  Ferritin 186. Both down from prior labs. Sodium 140 potassium 4.0 chloride 103 CO2 22 albumin 4.5 bilirubin 1.0 alkaline phosphatase 90 AST 24 and ALT 46.  Ideal ALT would be less than 35.  Glucose elevated 135 and show to primary MD.  BUN 13.  Creatinine 0.96.  White blood cell count 9.2 hemoglobin 15.6 platelet count 202 and MCV 95 neutrophils noted to be normal at 6.4.  Per notes from last visit October 2020 labs showed hemoglobin 16.3 platelet count 179 AST 32 and ALT 62 iron sat was 41% and ferritin 269.  Suspect he may have had a phlebotomy done since that last time frame. Meld 7 and meld na 7.   Prior maira a hx of LUQ pain that was prior a constant pain and this has been gone now doing mor exercises and nearly quit smoking.   He is a  and may have pulled a muscles and lot of issues due to that and so deal with that.  He has a history of kidney stones and that has not been an issue an none x 3-4 yrs.  Oct 2020 u/s: changes of cirrhosis, and stigmata of portal hypertension seen. No focal mass in liver. Liver appears to also be fatty to them possibly. Doppler of vessels patent. Partialy visualized pancreas unremarkable.  No bile duct dilation. Gallbladder absent and common bile duct normal at 5mm. Right kidney 11cm with no hydronephrosis. Spleen enlarged 16.3cm. No ascites/fluid.  Prior 2019 scan liver fatty and nodular and spleen 19.1cm. Liver and renal cysts seen better on mri than u.s.    Scans every 6m to look for cancer.  He has been losing weight and trying to eat better. 4-5 pounds.   Labs: 10-3 2020  labs: wbc 6.7 hg 16.3 and plat 179.  Glu 191 elevated and maybe not fasting and show local providers.cr 0.89.  na 139. K 4.4 and cl 103 and co2 22.  Ca 9.5. alb 4.8 and tb 0.9 and alk 85 and ast 32 and alt 62  (ideal alt less than35)  and iron sat 41% and ferritin 269.   Prior 2019 labs ast 27 and alt 51.   He is having sugar issues and he says that has been tough with the pandemic.  1/22/2019 :glu 119, creat 0.84, na 137, k 4.1, alb 4.8, carlos 2.2, t. bili 1.1, ALP 74, AST 27, ALT 51 (less than 30),  wbc 7.3, hgb 16.9, plts 206,000  7/08/2018: ALT 28, AST 18, ALP 72, t. bili 1.0  He had gained some weight post 2018. On strict diet post gb surgery and lost 15-20 pounds and down in 205.  He had his last phlebotomy  about every 6 months or so.  Dr Garcia when the mri issue came up. He had no reasons to do mri in addition.  CT scan 01/17/2019: there are nonobstructing left renal parenchymal calculi with kidneys otherwise unremarkable, the appearance of the liver suggests diffuse fatty infiltration without focal lesion, spleen 18 x 12.0 x 7.5cm and is otherwise unrematkable, pancreas unremarkable  Encounter info: [id number], [facility], Single Visit OP, 11/3/2018 - 11/3/2018  * Final Report *  Reason For Exam *histo* abnormal liver  REPORT EXAM: MRI Abdomen w/ + w/o Contrast  CLINICAL INDICATION: *histo* abnormal liver.  TECHNIQUE: Multisequence, multiplanar MRI of the abdomen was performed without and with intravenous contrast. Written informed consent was obtained for the use of intravenous contrast.  CONTRAST: 23 cc of Prohance  COMPARISON: MR abdomen 3/10/2018  FINDINGS:  Lower Thorax: Limited images through the lung bases are unremarkable.  Liver: Morphologic changes of chronic liver disease with relative hypertrophy of the left hepatic lobe and mild surface nodularity, similar to prior. Computed fat percentage in the liver of 10.9%. No suspicious arterially enhancing lesion in the liver with washout to indicate hepatocellular carcinoma.  Gallbladder/Biliary Tree: Gallbladder not identified. No biliary ductal dilatation.  Spleen: Spleen measures 19.5 cm in length and is therefore enlarged, similar to prior.  Pancreas: No dilatation of the main pancreatic duct. Pancreatic parenchymal signal intensity is within normal limits. No suspicious focal lesion in the pancreas.  Adrenal Glands: Normal.   Kidneys/Ureters: No hydronephrosis bilaterally. No suspicious focal lesion in either kidney. The patient has a few small renal cysts measuring for example 1.0 cm upper pole right kidney series 9 image 34 and measuring 0.5 cm interpolar left kidney series 26 image 51.  Gastrointestinal: No evidence of bowel obstruction in the abdomen. Pelvic bowel loops not fully included in the field-of-view.   Lymph Nodes: No concerning lymph nodes are seen.  Vessels: Celiac trunk and superior mesenteric artery are patent. Main portal vein is patent. Mildly prominent collateral vessels are identified in the upper abdomen anteriorly.  Peritoneum/Retroperitoneum: No free fluid.  Bones/Soft Tissues: No concerning osseous lesions are seen.  IMPRESSION:      1. Similar to prior morphologic changes of chronic liver disease. No findings of hepatocellular cancer. Recommend continued 6 month MR surveillance.  2. Similar to prior sequelae of portal venous hypertension including 19 cm splenomegaly. Main portal vein is patent. No intra-abdominal ascites.   Signature Line *** Final ***  Electronically Signed By:  Mena Burnette on  11/04/2018 11:02  Plan: 1. Have him work on sugars and weight and exercise has helped. Seeing the spleen smaller and the cirrhosis less obvious. 2. Labs better and meld is low. 3. Redo the u.s in 6m. 4. Asked re egd and needs to get that done with Dr Valladares. 5. He lives close to [place]  and do there.  Stressed to pt the need for social distancing and strict handwashing and wearing a mask and to follow any other new or added CDC recommendations as this is an evolving target.  Duration of the visit was  min with 4 min of chart prep and 26 min via healow with the pt visit with greater than 50% of the time spent on coordination of care  with the pt.

## 2021-11-05 ENCOUNTER — OFFICE VISIT (OUTPATIENT)
Dept: URBAN - METROPOLITAN AREA CLINIC 91 | Facility: CLINIC | Age: 56
End: 2021-11-05

## 2021-11-15 ENCOUNTER — OFFICE VISIT (OUTPATIENT)
Dept: URBAN - METROPOLITAN AREA CLINIC 94 | Facility: CLINIC | Age: 56
End: 2021-11-15
Payer: COMMERCIAL

## 2021-11-15 ENCOUNTER — WEB ENCOUNTER (OUTPATIENT)
Dept: URBAN - METROPOLITAN AREA CLINIC 94 | Facility: CLINIC | Age: 56
End: 2021-11-15

## 2021-11-15 VITALS
SYSTOLIC BLOOD PRESSURE: 131 MMHG | BODY MASS INDEX: 30.62 KG/M2 | WEIGHT: 231 LBS | HEART RATE: 83 BPM | DIASTOLIC BLOOD PRESSURE: 79 MMHG | TEMPERATURE: 97.9 F | HEIGHT: 73 IN

## 2021-11-15 DIAGNOSIS — K74.60 UNSPECIFIED CIRRHOSIS OF LIVER: ICD-10-CM

## 2021-11-15 DIAGNOSIS — K75.81 NONALCOHOLIC STEATOHEPATITIS (NASH): ICD-10-CM

## 2021-11-15 DIAGNOSIS — K22.70 BARRETT'S ESOPHAGUS WITHOUT DYSPLASIA: ICD-10-CM

## 2021-11-15 PROBLEM — 19943007: Status: ACTIVE | Noted: 2021-11-15

## 2021-11-15 PROCEDURE — 99214 OFFICE O/P EST MOD 30 MIN: CPT | Performed by: INTERNAL MEDICINE

## 2021-11-15 RX ORDER — GABAPENTIN 300 MG/1
TAKE 1 CAPSULE (300 MG) BY ORAL ROUTE 3 TIMES PER DAY CAPSULE ORAL ONCE A DAY
Refills: 0 | Status: ACTIVE | COMMUNITY
Start: 1900-01-01

## 2021-11-15 RX ORDER — DULAGLUTIDE 0.75 MG/.5ML
AS DIRECTED INJECTION, SOLUTION SUBCUTANEOUS
Status: ACTIVE | COMMUNITY

## 2021-11-15 RX ORDER — METFORMIN HYDROCHLORIDE 1000 MG/1
TAKE 1 TABLET (1,000 MG) BY ORAL ROUTE 2 TIMES PER DAY WITH MORNING AND EVENING MEALS TABLET, COATED ORAL 2
Qty: 0 | Refills: 0 | Status: ACTIVE | COMMUNITY
Start: 1900-01-01

## 2021-11-15 NOTE — HPI-TODAY'S VISIT:
Pt returns for followup. Pt has history of MONTANO cirrhosis for which he sees Dr Szymanski from hepatology. 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 No esophageal varices were seen. Pt states that GERD symptoms are well controlled with daily PPI Pt denies any GI symptoms. Dr Szymanski following closely for MONTANO cirrhosis.  Recent labs stable.

## 2021-11-19 ENCOUNTER — OFFICE VISIT (OUTPATIENT)
Dept: URBAN - METROPOLITAN AREA CLINIC 91 | Facility: CLINIC | Age: 56
End: 2021-11-19

## 2021-11-19 PROBLEM — 302914006: Status: ACTIVE | Noted: 2021-11-15

## 2021-12-02 ENCOUNTER — OFFICE VISIT (OUTPATIENT)
Dept: URBAN - METROPOLITAN AREA CLINIC 91 | Facility: CLINIC | Age: 56
End: 2021-12-02
Payer: COMMERCIAL

## 2021-12-02 ENCOUNTER — TELEPHONE ENCOUNTER (OUTPATIENT)
Dept: URBAN - METROPOLITAN AREA CLINIC 92 | Facility: CLINIC | Age: 56
End: 2021-12-02

## 2021-12-02 DIAGNOSIS — R79.89 ELEVATED LIVER FUNCTION TESTS: ICD-10-CM

## 2021-12-02 DIAGNOSIS — K76.0 FATTY (CHANGE OF) LIVER: ICD-10-CM

## 2021-12-02 PROCEDURE — 93975 VASCULAR STUDY: CPT

## 2021-12-02 RX ORDER — GABAPENTIN 300 MG/1
TAKE 1 CAPSULE (300 MG) BY ORAL ROUTE 3 TIMES PER DAY CAPSULE ORAL ONCE A DAY
Refills: 0 | Status: ACTIVE | COMMUNITY
Start: 1900-01-01

## 2021-12-02 RX ORDER — DULAGLUTIDE 0.75 MG/.5ML
AS DIRECTED INJECTION, SOLUTION SUBCUTANEOUS
Status: ACTIVE | COMMUNITY

## 2021-12-02 RX ORDER — METFORMIN HYDROCHLORIDE 1000 MG/1
TAKE 1 TABLET (1,000 MG) BY ORAL ROUTE 2 TIMES PER DAY WITH MORNING AND EVENING MEALS TABLET, COATED ORAL 2
Qty: 0 | Refills: 0 | Status: ACTIVE | COMMUNITY
Start: 1900-01-01

## 2021-12-02 NOTE — HPI-TODAY'S VISIT:
Jing Mario, December 2 ultrasound shows liver to be normal in contour and diffusely increased in echogenicity but with no suspicious liver lesions. No bile duct dilation seen. Common bile duct normal at 4.5 mm. Imaged pancreas portions unremarkable with tail partially not seen due to gas. Right kidney 12.4 cm with no hydronephrosis. Spleen enlarged at 17.2 cm with no suspicious splenic lesions. Liver vessels patent. Overall liver appears to be fatty. Prior April ultrasound showed the liver to be fatty and with an enlarged spleen. The MRI done in 2019 saw the liver much better and showed the chronic liver disease changes and surface nodularity and advised that you had 10.9% fat.  Unfortunately u.s cannot see this as well. Dr Szymanski

## 2021-12-10 ENCOUNTER — OFFICE VISIT (OUTPATIENT)
Dept: URBAN - METROPOLITAN AREA SURGERY CENTER 17 | Facility: SURGERY CENTER | Age: 56
End: 2021-12-10
Payer: COMMERCIAL

## 2021-12-10 ENCOUNTER — CLAIMS CREATED FROM THE CLAIM WINDOW (OUTPATIENT)
Dept: URBAN - METROPOLITAN AREA CLINIC 4 | Facility: CLINIC | Age: 56
End: 2021-12-10
Payer: COMMERCIAL

## 2021-12-10 DIAGNOSIS — K31.89 DUODENAL ERYTHEMA: ICD-10-CM

## 2021-12-10 DIAGNOSIS — K31.89 ACQUIRED DEFORMITY OF DUODENUM: ICD-10-CM

## 2021-12-10 DIAGNOSIS — K22.711 BARRETT'S ESOPHAGUS WITH HIGH GRADE DYSPLASIA: ICD-10-CM

## 2021-12-10 PROCEDURE — G8907 PT DOC NO EVENTS ON DISCHARG: HCPCS | Performed by: INTERNAL MEDICINE

## 2021-12-10 PROCEDURE — 88312 SPECIAL STAINS GROUP 1: CPT | Performed by: PATHOLOGY

## 2021-12-10 PROCEDURE — 43239 EGD BIOPSY SINGLE/MULTIPLE: CPT | Performed by: INTERNAL MEDICINE

## 2021-12-10 PROCEDURE — 88305 TISSUE EXAM BY PATHOLOGIST: CPT | Performed by: PATHOLOGY

## 2021-12-10 RX ORDER — GABAPENTIN 300 MG/1
TAKE 1 CAPSULE (300 MG) BY ORAL ROUTE 3 TIMES PER DAY CAPSULE ORAL ONCE A DAY
Refills: 0 | Status: ACTIVE | COMMUNITY
Start: 1900-01-01

## 2021-12-10 RX ORDER — METFORMIN HYDROCHLORIDE 1000 MG/1
TAKE 1 TABLET (1,000 MG) BY ORAL ROUTE 2 TIMES PER DAY WITH MORNING AND EVENING MEALS TABLET, COATED ORAL 2
Qty: 0 | Refills: 0 | Status: ACTIVE | COMMUNITY
Start: 1900-01-01

## 2021-12-10 RX ORDER — DULAGLUTIDE 0.75 MG/.5ML
AS DIRECTED INJECTION, SOLUTION SUBCUTANEOUS
Status: ACTIVE | COMMUNITY

## 2021-12-10 RX ORDER — OMEPRAZOLE 40 MG/1
TAKE 1 CAPSULE (40 MG) BY ORAL ROUTE ONCE DAILY BEFORE A MEAL FOR 90 DAYS CAPSULE, DELAYED RELEASE PELLETS ORAL 1
Qty: 90 | Refills: 3 | OUTPATIENT
Start: 2021-12-10

## 2021-12-15 ENCOUNTER — TELEPHONE ENCOUNTER (OUTPATIENT)
Dept: URBAN - METROPOLITAN AREA CLINIC 92 | Facility: CLINIC | Age: 56
End: 2021-12-15

## 2021-12-15 RX ORDER — OMEPRAZOLE 40 MG/1
TAKE 1 CAPSULE (40 MG) BY ORAL ROUTE 2 TIMES PER DAY BEFORE A MEAL FOR 30 DAYS CAPSULE, DELAYED RELEASE PELLETS ORAL 2
Qty: 180 | Refills: 3 | OUTPATIENT
Start: 2021-12-15

## 2021-12-15 NOTE — HPI-TODAY'S VISIT:
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 He states that this was confirmed by a 2nd pathologist at [facility]. I called pt and notified him of findings. Pt states that he was noncompliant with his PPI therapy. I have recommended EGD with EMR and RFA by Dr Chapa at our [facility] [place] location.  Will forward this info to Dr Chapa. Pt instructed to start omeprazole 40 mg BID. Script sent.

## 2022-01-10 ENCOUNTER — OFFICE VISIT (OUTPATIENT)
Dept: URBAN - METROPOLITAN AREA CLINIC 94 | Facility: CLINIC | Age: 57
End: 2022-01-10

## 2022-01-20 ENCOUNTER — OFFICE VISIT (OUTPATIENT)
Dept: URBAN - METROPOLITAN AREA MEDICAL CENTER 28 | Facility: MEDICAL CENTER | Age: 57
End: 2022-01-20
Payer: COMMERCIAL

## 2022-01-20 ENCOUNTER — LAB OUTSIDE AN ENCOUNTER (OUTPATIENT)
Dept: URBAN - METROPOLITAN AREA CLINIC 92 | Facility: CLINIC | Age: 57
End: 2022-01-20

## 2022-01-20 ENCOUNTER — TELEPHONE ENCOUNTER (OUTPATIENT)
Dept: URBAN - METROPOLITAN AREA CLINIC 92 | Facility: CLINIC | Age: 57
End: 2022-01-20

## 2022-01-20 DIAGNOSIS — K22.711 BARRETT'S ESOPHAGUS WITH HIGH GRADE DYSPLASIA: ICD-10-CM

## 2022-01-20 DIAGNOSIS — K22.89 ESOPHAGEAL BLEEDING: ICD-10-CM

## 2022-01-20 LAB
GLUCOSE POC: 209
PERFORMING LAB: (no result)

## 2022-01-20 PROCEDURE — 43270 EGD LESION ABLATION: CPT | Performed by: INTERNAL MEDICINE

## 2022-01-20 RX ORDER — OMEPRAZOLE 40 MG/1
TAKE 1 CAPSULE (40 MG) BY ORAL ROUTE ONCE DAILY BEFORE A MEAL FOR 90 DAYS CAPSULE, DELAYED RELEASE PELLETS ORAL 1
Qty: 90 | Refills: 3 | Status: ACTIVE | COMMUNITY
Start: 2021-12-10

## 2022-01-20 RX ORDER — DULAGLUTIDE 0.75 MG/.5ML
AS DIRECTED INJECTION, SOLUTION SUBCUTANEOUS
Status: ACTIVE | COMMUNITY

## 2022-01-20 RX ORDER — OMEPRAZOLE 40 MG/1
TAKE 1 CAPSULE (40 MG) BY ORAL ROUTE 2 TIMES PER DAY BEFORE A MEAL FOR 30 DAYS CAPSULE, DELAYED RELEASE PELLETS ORAL 2
Qty: 180 | Refills: 3 | Status: ACTIVE | COMMUNITY
Start: 2021-12-15

## 2022-01-20 RX ORDER — GABAPENTIN 300 MG/1
TAKE 1 CAPSULE (300 MG) BY ORAL ROUTE 3 TIMES PER DAY CAPSULE ORAL ONCE A DAY
Refills: 0 | Status: ACTIVE | COMMUNITY
Start: 1900-01-01

## 2022-01-20 RX ORDER — LIDOCAINE HYDROCHLORIDE 40 MG/ML
15 ML SOLUTION TOPICAL
Qty: 300 MILLILITER | Refills: 3 | OUTPATIENT
Start: 2022-01-20

## 2022-01-20 RX ORDER — METFORMIN HYDROCHLORIDE 1000 MG/1
TAKE 1 TABLET (1,000 MG) BY ORAL ROUTE 2 TIMES PER DAY WITH MORNING AND EVENING MEALS TABLET, COATED ORAL 2
Qty: 0 | Refills: 0 | Status: ACTIVE | COMMUNITY
Start: 1900-01-01

## 2022-01-21 ENCOUNTER — TELEPHONE ENCOUNTER (OUTPATIENT)
Dept: URBAN - METROPOLITAN AREA CLINIC 92 | Facility: CLINIC | Age: 57
End: 2022-01-21

## 2022-01-21 RX ORDER — DULAGLUTIDE 0.75 MG/.5ML
AS DIRECTED INJECTION, SOLUTION SUBCUTANEOUS
Status: ACTIVE | COMMUNITY

## 2022-01-21 RX ORDER — LIDOCAINE HYDROCHLORIDE 40 MG/ML
15 ML SOLUTION TOPICAL
Qty: 300 MILLILITER | Refills: 3 | Status: ACTIVE | COMMUNITY
Start: 2022-01-20

## 2022-01-21 RX ORDER — OMEPRAZOLE 40 MG/1
TAKE 1 CAPSULE (40 MG) BY ORAL ROUTE ONCE DAILY BEFORE A MEAL FOR 90 DAYS CAPSULE, DELAYED RELEASE PELLETS ORAL 1
Qty: 90 | Refills: 3 | Status: ACTIVE | COMMUNITY
Start: 2021-12-10

## 2022-01-21 RX ORDER — GABAPENTIN 300 MG/1
TAKE 1 CAPSULE (300 MG) BY ORAL ROUTE 3 TIMES PER DAY CAPSULE ORAL ONCE A DAY
Refills: 0 | Status: ACTIVE | COMMUNITY
Start: 1900-01-01

## 2022-01-21 RX ORDER — METFORMIN HYDROCHLORIDE 1000 MG/1
TAKE 1 TABLET (1,000 MG) BY ORAL ROUTE 2 TIMES PER DAY WITH MORNING AND EVENING MEALS TABLET, COATED ORAL 2
Qty: 0 | Refills: 0 | Status: ACTIVE | COMMUNITY
Start: 1900-01-01

## 2022-01-21 RX ORDER — ONDANSETRON 4 MG/1
1 TABLET ON THE TONGUE AND ALLOW TO DISSOLVE TABLET, ORALLY DISINTEGRATING ORAL
Qty: 30 | Refills: 6 | OUTPATIENT
Start: 2022-01-21

## 2022-01-21 RX ORDER — OMEPRAZOLE 40 MG/1
TAKE 1 CAPSULE (40 MG) BY ORAL ROUTE 2 TIMES PER DAY BEFORE A MEAL FOR 30 DAYS CAPSULE, DELAYED RELEASE PELLETS ORAL 2
Qty: 180 | Refills: 3 | Status: ACTIVE | COMMUNITY
Start: 2021-12-15

## 2022-01-24 ENCOUNTER — OFFICE VISIT (OUTPATIENT)
Dept: URBAN - METROPOLITAN AREA CLINIC 94 | Facility: CLINIC | Age: 57
End: 2022-01-24
Payer: COMMERCIAL

## 2022-01-24 ENCOUNTER — TELEPHONE ENCOUNTER (OUTPATIENT)
Dept: URBAN - METROPOLITAN AREA CLINIC 94 | Facility: CLINIC | Age: 57
End: 2022-01-24

## 2022-01-24 ENCOUNTER — WEB ENCOUNTER (OUTPATIENT)
Dept: URBAN - METROPOLITAN AREA CLINIC 94 | Facility: CLINIC | Age: 57
End: 2022-01-24

## 2022-01-24 VITALS
WEIGHT: 217 LBS | SYSTOLIC BLOOD PRESSURE: 142 MMHG | TEMPERATURE: 97.5 F | HEIGHT: 73 IN | HEART RATE: 83 BPM | BODY MASS INDEX: 28.76 KG/M2 | DIASTOLIC BLOOD PRESSURE: 85 MMHG

## 2022-01-24 DIAGNOSIS — R13.10 ODYNOPHAGIA: ICD-10-CM

## 2022-01-24 DIAGNOSIS — K22.711 BARRETT'S ESOPHAGUS WITH HIGH GRADE DYSPLASIA: ICD-10-CM

## 2022-01-24 PROBLEM — 30233002: Status: ACTIVE | Noted: 2022-01-24

## 2022-01-24 PROCEDURE — 99214 OFFICE O/P EST MOD 30 MIN: CPT | Performed by: INTERNAL MEDICINE

## 2022-01-24 RX ORDER — ONDANSETRON 4 MG/1
1 TABLET ON THE TONGUE AND ALLOW TO DISSOLVE TABLET, ORALLY DISINTEGRATING ORAL
Qty: 30 | Refills: 6 | Status: ACTIVE | COMMUNITY
Start: 2022-01-21

## 2022-01-24 RX ORDER — DULAGLUTIDE 0.75 MG/.5ML
AS DIRECTED INJECTION, SOLUTION SUBCUTANEOUS
Status: ACTIVE | COMMUNITY

## 2022-01-24 RX ORDER — LIDOCAINE HYDROCHLORIDE 40 MG/ML
15 ML SOLUTION TOPICAL
Qty: 300 MILLILITER | Refills: 3 | Status: ACTIVE | COMMUNITY
Start: 2022-01-20

## 2022-01-24 RX ORDER — OMEPRAZOLE 40 MG/1
TAKE 1 CAPSULE (40 MG) BY ORAL ROUTE 2 TIMES PER DAY BEFORE A MEAL FOR 30 DAYS CAPSULE, DELAYED RELEASE PELLETS ORAL 2
Qty: 180 | Refills: 3 | Status: DISCONTINUED | COMMUNITY
Start: 2021-12-15

## 2022-01-24 RX ORDER — OMEPRAZOLE 40 MG/1
TAKE 1 CAPSULE (40 MG) BY ORAL ROUTE ONCE DAILY BEFORE A MEAL FOR 90 DAYS CAPSULE, DELAYED RELEASE PELLETS ORAL 1
Qty: 90 | Refills: 3 | Status: ACTIVE | COMMUNITY
Start: 2021-12-10

## 2022-01-24 RX ORDER — METFORMIN HYDROCHLORIDE 1000 MG/1
TAKE 1 TABLET (1,000 MG) BY ORAL ROUTE 2 TIMES PER DAY WITH MORNING AND EVENING MEALS TABLET, COATED ORAL 2
Qty: 0 | Refills: 0 | Status: ACTIVE | COMMUNITY
Start: 1900-01-01

## 2022-01-24 RX ORDER — TRAMADOL HYDROCHLORIDE 50 MG/1
1 TABLET AS NEEDED TABLET, FILM COATED ORAL ONCE A DAY
Qty: 7 TABLET | Refills: 0 | OUTPATIENT

## 2022-01-24 RX ORDER — LIDOCAINE HYDROCHLORIDE 40 MG/ML
15 ML SOLUTION TOPICAL
Qty: 300 MILLILITER | Refills: 3
Start: 2022-01-20

## 2022-01-24 RX ORDER — OMEPRAZOLE 40 MG/1
TAKE 1 CAPSULE (40 MG) BY ORAL ROUTE ONCE DAILY BEFORE A MEAL FOR 90 DAYS CAPSULE, DELAYED RELEASE PELLETS ORAL TWICE A DAY
Qty: 60 | Refills: 3

## 2022-01-24 RX ORDER — GABAPENTIN 300 MG/1
TAKE 1 CAPSULE (300 MG) BY ORAL ROUTE 3 TIMES PER DAY CAPSULE ORAL ONCE A DAY
Refills: 0 | Status: ACTIVE | COMMUNITY
Start: 1900-01-01

## 2022-01-24 NOTE — HPI-TODAY'S VISIT:
Pt  with a hx of Lopez's esophagus with high grade dysplasia , MONTANO, hepatic cirrhosis, hemochromatosis, fatty liver disease and DM. He present swith epigastric abdominal pain and odynophagia  that began after EGD with RFA 01/20/2022 with Dr. Mcfarland (GI) at [facility]. He states that the pain has not improved and that he has been unable to eat or drink anything since. Notes some episodes of vomiting that has since resolved .Patient denies fever, melena, hemoptysis, and hematemesis. Pt evaluated at [facility] ER earlier today . CT chest, abd, pelvis showed no acute abnormality. No evidence of free fluid or free air.

## 2022-01-26 ENCOUNTER — TELEPHONE ENCOUNTER (OUTPATIENT)
Dept: URBAN - METROPOLITAN AREA CLINIC 92 | Facility: CLINIC | Age: 57
End: 2022-01-26

## 2022-01-26 ENCOUNTER — TELEPHONE ENCOUNTER (OUTPATIENT)
Dept: URBAN - METROPOLITAN AREA CLINIC 94 | Facility: CLINIC | Age: 57
End: 2022-01-26

## 2022-02-21 ENCOUNTER — OFFICE VISIT (OUTPATIENT)
Dept: URBAN - METROPOLITAN AREA CLINIC 94 | Facility: CLINIC | Age: 57
End: 2022-02-21
Payer: COMMERCIAL

## 2022-02-21 VITALS
SYSTOLIC BLOOD PRESSURE: 123 MMHG | HEIGHT: 73 IN | TEMPERATURE: 97.5 F | DIASTOLIC BLOOD PRESSURE: 75 MMHG | WEIGHT: 219 LBS | HEART RATE: 88 BPM | BODY MASS INDEX: 29.03 KG/M2

## 2022-02-21 DIAGNOSIS — K22.711 BARRETT'S ESOPHAGUS WITH HIGH GRADE DYSPLASIA: ICD-10-CM

## 2022-02-21 PROCEDURE — 99213 OFFICE O/P EST LOW 20 MIN: CPT | Performed by: INTERNAL MEDICINE

## 2022-02-21 RX ORDER — ONDANSETRON 4 MG/1
1 TABLET ON THE TONGUE AND ALLOW TO DISSOLVE TABLET, ORALLY DISINTEGRATING ORAL
Qty: 30 | Refills: 6 | Status: ACTIVE | COMMUNITY
Start: 2022-01-21

## 2022-02-21 RX ORDER — TRAMADOL HYDROCHLORIDE 50 MG/1
1 TABLET AS NEEDED TABLET, FILM COATED ORAL ONCE A DAY
Qty: 7 TABLET | Refills: 0 | Status: ON HOLD | COMMUNITY

## 2022-02-21 RX ORDER — METFORMIN HYDROCHLORIDE 1000 MG/1
TAKE 1 TABLET (1,000 MG) BY ORAL ROUTE 2 TIMES PER DAY WITH MORNING AND EVENING MEALS TABLET, COATED ORAL 2
Qty: 0 | Refills: 0 | Status: ACTIVE | COMMUNITY
Start: 1900-01-01

## 2022-02-21 RX ORDER — OMEPRAZOLE 40 MG/1
TAKE 1 CAPSULE (40 MG) BY ORAL ROUTE ONCE DAILY BEFORE A MEAL FOR 90 DAYS CAPSULE, DELAYED RELEASE PELLETS ORAL TWICE A DAY
Qty: 60 | Refills: 3 | Status: ACTIVE | COMMUNITY

## 2022-02-21 RX ORDER — DULAGLUTIDE 0.75 MG/.5ML
AS DIRECTED INJECTION, SOLUTION SUBCUTANEOUS
Status: ACTIVE | COMMUNITY

## 2022-02-21 RX ORDER — GABAPENTIN 300 MG/1
TAKE 1 CAPSULE (300 MG) BY ORAL ROUTE 3 TIMES PER DAY CAPSULE ORAL ONCE A DAY
Refills: 0 | Status: ACTIVE | COMMUNITY
Start: 1900-01-01

## 2022-02-21 RX ORDER — LIDOCAINE HYDROCHLORIDE 40 MG/ML
15 ML SOLUTION TOPICAL
Qty: 300 MILLILITER | Refills: 3 | Status: ON HOLD | COMMUNITY
Start: 2022-01-20

## 2022-02-21 NOTE — HPI-TODAY'S VISIT:
Pt  with a hx of Lopez's esophagus with high grade dysplasia , MONTANO, hepatic cirrhosis, hemochromatosis, fatty liver disease and DM. He had EGD with RFA 01/20/2022 with Dr. Mcfarland (GI) at [facility]. He had chest pain and odynophagia for a few days after the procedure, which resolved with carafate and BID PPI. Pt currently denies GERD, chest pain or dysphagia His next EGD with RFA is with Dr Chapa on 3/31/22.

## 2022-03-29 ENCOUNTER — TELEPHONE ENCOUNTER (OUTPATIENT)
Dept: URBAN - METROPOLITAN AREA CLINIC 94 | Facility: CLINIC | Age: 57
End: 2022-03-29

## 2022-03-29 RX ORDER — SUCRALFATE 1 G/10ML
10 ML ON AN EMPTY STOMACH SUSPENSION ORAL
Qty: 1200 ML | Refills: 2 | OUTPATIENT
Start: 2022-03-29 | End: 2022-06-27

## 2022-03-31 ENCOUNTER — OFFICE VISIT (OUTPATIENT)
Dept: URBAN - METROPOLITAN AREA MEDICAL CENTER 28 | Facility: MEDICAL CENTER | Age: 57
End: 2022-03-31
Payer: COMMERCIAL

## 2022-03-31 ENCOUNTER — TELEPHONE ENCOUNTER (OUTPATIENT)
Dept: URBAN - METROPOLITAN AREA CLINIC 92 | Facility: CLINIC | Age: 57
End: 2022-03-31

## 2022-03-31 ENCOUNTER — LAB OUTSIDE AN ENCOUNTER (OUTPATIENT)
Dept: URBAN - METROPOLITAN AREA CLINIC 92 | Facility: CLINIC | Age: 57
End: 2022-03-31

## 2022-03-31 DIAGNOSIS — K22.711 BARRETT'S ESOPHAGUS WITH HIGH GRADE DYSPLASIA: ICD-10-CM

## 2022-03-31 DIAGNOSIS — K22.89 ESOPHAGEAL BLEED, NON-VARICEAL: ICD-10-CM

## 2022-03-31 PROBLEM — 1082761000119106: Status: ACTIVE | Noted: 2021-12-15

## 2022-03-31 LAB
GLUCOSE POC: 109
PERFORMING LAB: (no result)

## 2022-03-31 PROCEDURE — 43270 EGD LESION ABLATION: CPT | Performed by: INTERNAL MEDICINE

## 2022-03-31 RX ORDER — METFORMIN HYDROCHLORIDE 1000 MG/1
TAKE 1 TABLET (1,000 MG) BY ORAL ROUTE 2 TIMES PER DAY WITH MORNING AND EVENING MEALS TABLET, COATED ORAL 2
Qty: 0 | Refills: 0 | Status: ACTIVE | COMMUNITY
Start: 1900-01-01

## 2022-03-31 RX ORDER — SUCRALFATE 1 G/10ML
10 ML ON AN EMPTY STOMACH SUSPENSION ORAL
Qty: 1200 ML | Refills: 2 | Status: ACTIVE | COMMUNITY
Start: 2022-03-29 | End: 2022-06-27

## 2022-03-31 RX ORDER — DULAGLUTIDE 0.75 MG/.5ML
AS DIRECTED INJECTION, SOLUTION SUBCUTANEOUS
Status: ACTIVE | COMMUNITY

## 2022-03-31 RX ORDER — LIDOCAINE HYDROCHLORIDE 40 MG/ML
15 ML SOLUTION TOPICAL
Qty: 300 MILLILITER | Refills: 3 | Status: ON HOLD | COMMUNITY
Start: 2022-01-20

## 2022-03-31 RX ORDER — OMEPRAZOLE 40 MG/1
TAKE 1 CAPSULE (40 MG) BY ORAL ROUTE ONCE DAILY BEFORE A MEAL FOR 90 DAYS CAPSULE, DELAYED RELEASE PELLETS ORAL TWICE A DAY
Qty: 60 | Refills: 3 | Status: ACTIVE | COMMUNITY

## 2022-03-31 RX ORDER — GABAPENTIN 300 MG/1
TAKE 1 CAPSULE (300 MG) BY ORAL ROUTE 3 TIMES PER DAY CAPSULE ORAL ONCE A DAY
Refills: 0 | Status: ACTIVE | COMMUNITY
Start: 1900-01-01

## 2022-03-31 RX ORDER — TRAMADOL HYDROCHLORIDE 50 MG/1
1 TABLET AS NEEDED TABLET, FILM COATED ORAL ONCE A DAY
Qty: 7 TABLET | Refills: 0 | Status: ON HOLD | COMMUNITY

## 2022-03-31 RX ORDER — ONDANSETRON 4 MG/1
1 TABLET ON THE TONGUE AND ALLOW TO DISSOLVE TABLET, ORALLY DISINTEGRATING ORAL
Qty: 30 | Refills: 6 | Status: ACTIVE | COMMUNITY
Start: 2022-01-21

## 2022-04-04 ENCOUNTER — LAB OUTSIDE AN ENCOUNTER (OUTPATIENT)
Dept: URBAN - METROPOLITAN AREA TELEHEALTH 2 | Facility: TELEHEALTH | Age: 57
End: 2022-04-04

## 2022-04-11 ENCOUNTER — LAB OUTSIDE AN ENCOUNTER (OUTPATIENT)
Dept: URBAN - METROPOLITAN AREA TELEHEALTH 2 | Facility: TELEHEALTH | Age: 57
End: 2022-04-11

## 2022-04-20 ENCOUNTER — WEB ENCOUNTER (OUTPATIENT)
Dept: URBAN - METROPOLITAN AREA CLINIC 86 | Facility: CLINIC | Age: 57
End: 2022-04-20

## 2022-04-22 ENCOUNTER — TELEPHONE ENCOUNTER (OUTPATIENT)
Dept: URBAN - METROPOLITAN AREA CLINIC 92 | Facility: CLINIC | Age: 57
End: 2022-04-22

## 2022-04-22 LAB
A/G RATIO: 2
ALBUMIN: 4.4
ALKALINE PHOSPHATASE: 77
ALT (SGPT): 53
AST (SGOT): 34
BASO (ABSOLUTE): 0
BASOS: 1
BILIRUBIN, TOTAL: 0.8
BUN/CREATININE RATIO: 16
BUN: 13
CALCIUM: 9.8
CARBON DIOXIDE, TOTAL: 22
CHLORIDE: 103
CREATININE: 0.81
EGFR: 102
EOS (ABSOLUTE): 0.6
EOS: 8
FERRITIN, SERUM: 164
GLOBULIN, TOTAL: 2.2
GLUCOSE: 65
HEMATOCRIT: 44.6
HEMATOLOGY COMMENTS:: (no result)
HEMOGLOBIN: 15.4
IMMATURE CELLS: (no result)
IMMATURE GRANS (ABS): 0.1
IMMATURE GRANULOCYTES: 1
INR: 1.1
IRON BIND.CAP.(TIBC): 262
IRON SATURATION: 34
IRON: 88
LYMPHS (ABSOLUTE): 1.7
LYMPHS: 22
MCH: 33
MCHC: 34.5
MCV: 96
MONOCYTES(ABSOLUTE): 0.6
MONOCYTES: 8
NEUTROPHILS (ABSOLUTE): 4.6
NEUTROPHILS: 60
NRBC: (no result)
PLATELETS: 215
POTASSIUM: 4.2
PROTEIN, TOTAL: 6.6
PROTHROMBIN TIME: 11.1
RBC: 4.67
RDW: 12.1
SODIUM: 140
UIBC: 174
WBC: 7.6

## 2022-04-22 NOTE — HPI-TODAY'S VISIT:
Dear Carlos Mario, April 21 ferritin 164 and down from 280 in oct 2021. Iron 34% and down from 52 in oct. Inr 1.1.  Glu 65 and bun 13 and cr 0.81 and na 140 and k 4.2 and cl 103 and co2 22 and ca 9.8 and alb 4.4 and tb 0.8 and alk 77 and ast 34 and alt 53 an dprior ast 25 and alt 55. Ideal alt less than 35. Wbc 7.6 hg 15.4 and plat 215. Mcv 96. Neutrophils 4.6 and lymphs 1.7. Eos 0.6 little up and maybe allergies with pollen? Meld 7 and meld na 7. Will discuss next week further. Dr Szymanski

## 2022-04-26 ENCOUNTER — OFFICE VISIT (OUTPATIENT)
Dept: URBAN - METROPOLITAN AREA CLINIC 91 | Facility: CLINIC | Age: 57
End: 2022-04-26
Payer: COMMERCIAL

## 2022-04-26 ENCOUNTER — OFFICE VISIT (OUTPATIENT)
Dept: URBAN - METROPOLITAN AREA CLINIC 86 | Facility: CLINIC | Age: 57
End: 2022-04-26
Payer: COMMERCIAL

## 2022-04-26 ENCOUNTER — TELEPHONE ENCOUNTER (OUTPATIENT)
Dept: URBAN - METROPOLITAN AREA CLINIC 92 | Facility: CLINIC | Age: 57
End: 2022-04-26

## 2022-04-26 DIAGNOSIS — Z98.890 HISTORY OF COLONOSCOPY: ICD-10-CM

## 2022-04-26 DIAGNOSIS — R93.2 ABNORMAL LIVER DIAGNOSTIC IMAGING: ICD-10-CM

## 2022-04-26 DIAGNOSIS — K76.0 HEPATIC STEATOSIS: ICD-10-CM

## 2022-04-26 DIAGNOSIS — K75.81 NASH WITH CIRRHOSIS: ICD-10-CM

## 2022-04-26 DIAGNOSIS — Z71.89 VACCINE COUNSELING: ICD-10-CM

## 2022-04-26 DIAGNOSIS — K74.69 OTHER CIRRHOSIS OF LIVER: ICD-10-CM

## 2022-04-26 DIAGNOSIS — R17 ELEVATED BILIRUBIN: ICD-10-CM

## 2022-04-26 DIAGNOSIS — R16.1 SPLENOMEGALY: ICD-10-CM

## 2022-04-26 DIAGNOSIS — Z98.890 HISTORY OF LIVER BIOPSY: ICD-10-CM

## 2022-04-26 DIAGNOSIS — Z71.6 TOBACCO ABUSE COUNSELING: ICD-10-CM

## 2022-04-26 DIAGNOSIS — Z90.49 HISTORY OF CHOLECYSTECTOMY: ICD-10-CM

## 2022-04-26 DIAGNOSIS — R16.0 HEPATOMEGALY: ICD-10-CM

## 2022-04-26 DIAGNOSIS — K75.81 NASH (NONALCOHOLIC STEATOHEPATITIS): ICD-10-CM

## 2022-04-26 DIAGNOSIS — E11.9 TYPE 2 DIABETES MELLITUS WITHOUT COMPLICATION, WITHOUT LONG-TERM CURRENT USE OF INSULIN: ICD-10-CM

## 2022-04-26 DIAGNOSIS — N62 GYNECOMASTIA: ICD-10-CM

## 2022-04-26 DIAGNOSIS — E83.110 HEREDITARY HEMOCHROMATOSIS: ICD-10-CM

## 2022-04-26 DIAGNOSIS — E66.3 OVERWEIGHT: ICD-10-CM

## 2022-04-26 PROCEDURE — 93975 VASCULAR STUDY: CPT

## 2022-04-26 PROCEDURE — 76705 ECHO EXAM OF ABDOMEN: CPT

## 2022-04-26 PROCEDURE — 99214 OFFICE O/P EST MOD 30 MIN: CPT

## 2022-04-26 RX ORDER — GABAPENTIN 300 MG/1
TAKE 1 CAPSULE (300 MG) BY ORAL ROUTE 3 TIMES PER DAY CAPSULE ORAL ONCE A DAY
Refills: 0 | Status: ACTIVE | COMMUNITY
Start: 1900-01-01

## 2022-04-26 RX ORDER — ONDANSETRON 4 MG/1
1 TABLET ON THE TONGUE AND ALLOW TO DISSOLVE TABLET, ORALLY DISINTEGRATING ORAL
Qty: 30 | Refills: 6 | Status: ACTIVE | COMMUNITY
Start: 2022-01-21

## 2022-04-26 RX ORDER — METFORMIN HYDROCHLORIDE 1000 MG/1
TAKE 1 TABLET (1,000 MG) BY ORAL ROUTE 2 TIMES PER DAY WITH MORNING AND EVENING MEALS TABLET, COATED ORAL 2
Qty: 0 | Refills: 0 | Status: ACTIVE | COMMUNITY
Start: 1900-01-01

## 2022-04-26 RX ORDER — SUCRALFATE 1 G/10ML
10 ML ON AN EMPTY STOMACH SUSPENSION ORAL
Qty: 1200 ML | Refills: 2 | Status: ACTIVE | COMMUNITY
Start: 2022-03-29 | End: 2022-06-27

## 2022-04-26 RX ORDER — LIDOCAINE HYDROCHLORIDE 40 MG/ML
15 ML SOLUTION TOPICAL
Qty: 300 MILLILITER | Refills: 3 | Status: ON HOLD | COMMUNITY
Start: 2022-01-20

## 2022-04-26 RX ORDER — OMEPRAZOLE 40 MG/1
TAKE 1 CAPSULE (40 MG) BY ORAL ROUTE ONCE DAILY BEFORE A MEAL FOR 90 DAYS CAPSULE, DELAYED RELEASE PELLETS ORAL TWICE A DAY
Qty: 60 | Refills: 3 | Status: ACTIVE | COMMUNITY

## 2022-04-26 RX ORDER — DULAGLUTIDE 0.75 MG/.5ML
AS DIRECTED INJECTION, SOLUTION SUBCUTANEOUS
Status: ACTIVE | COMMUNITY

## 2022-04-26 RX ORDER — TRAMADOL HYDROCHLORIDE 50 MG/1
1 TABLET AS NEEDED TABLET, FILM COATED ORAL ONCE A DAY
Qty: 7 TABLET | Refills: 0 | Status: ON HOLD | COMMUNITY

## 2022-04-26 RX ORDER — TRAMADOL HYDROCHLORIDE 50 MG/1
1 TABLET AS NEEDED TABLET, FILM COATED ORAL ONCE A DAY
Qty: 7 TABLET | Refills: 0 | Status: DISCONTINUED | COMMUNITY

## 2022-04-26 NOTE — HPI-TODAY'S VISIT:
The patient is a 56 year old /White male, who presents after last visit Oct 2021 on a prior referral from Carlos Villarreal MD, for a gastroenterology evaluation of cirrhosis complicatd by portal hypertension and splenomegaly.   A copy of this document will be sent to the referring provider.  [place]ives [place] outside [place].  He did u.s today and did labs [facility].  December 2 ultrasound shows liver to be normal in contour and diffusely increased in echogenicity but with no suspicious liver lesions. No bile duct dilation seen. Common bile duct normal at 4.5 mm. Imaged pancreas portions unremarkable with tail partially not seen due to gas. Right kidney 12.4 cm with no hydronephrosis. Spleen enlarged at 17.2 cm with no suspicious splenic lesions. Liver vessels patent. Overall liver appears to be fatty.  Prior April 2021 ultrasound showed the liver to be fatty and with an enlarged spleen.  The MRI done in 2019 saw the liver much better and showed the chronic liver disease changes and surface nodularity and advised that you had 10.9% fat.  Unfortunately u.s cannot see this as well.  October 19 labs show white blood cell count 6.2 hemoglobin 15.3 platelet count 173 MCV 94 and  neutrophils 3.9 and lymphocytes 1.4.   BUN of 14 creatinine 0.91 sodium 136 potassium 4.4 calcium 9.8 albumin 4.7 bilirubin 1.0 alk phosphatase 74 AST 25 ALT 55.  Previously AST 24 ALT 46.  Ferritin up to 280 from 186.  Iron saturation up to 52% ferritin 38.  Still in the normal range.     He has lost 25 pounds for this now.  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 Did the first tx.  Nside Dr Chapa there. Told sore throat x2 day and 5 days later in pain and not able to eat and lost 20 ppunds and did ivf and they saw Dr Valladares and then 1m ago and did 2nd one and did better and did pain meds in iv and nause and did better.  April 21 2022 and wbc 7.6 hg 15.4 plat 215 andm mcv 96 and neutrophils 4.6 and lymphs 1.7 and eos 0.6 elevated.  glu 65 and bun 13 and cr 0.81 and na 140 and k 4.2 nd cl 103 and co2 22 and ca 9.8 anmd alb 4.4 and 0.8 and alk 77 and ast 34 and alt 53 (prtior ast 25 and alt 55).. Iron sat 34%.  inr 1.1.   I thinklabs higher due to stress of events.  He says he quit smoking for 3m and  he stopped that and staying off that.  Dr Garcia removes a unit every 6m.  Sugars has been better.  April 8 2021 ultrasound shows nonvisualization of the gallbladder consistent with prior surgery.  Common bile duct normal at 4 mm.  Liver did appear to be diffusely increased in echogenicity and consistent with a possible fatty infiltration.  No focal abnormality was seen.  Imaged pancreas was unremarkable.  Right kidney 11.5 cm with no hydronephrosis.  Spleen was enlarged at 15 cm.  Liver vessels were patent.  April 2 labs showed INR 1.1 iron saturation normal at 38%.  Ferritin 186. Both down from prior labs. Sodium 140 potassium 4.0 chloride 103 CO2 22 albumin 4.5 bilirubin 1.0 alkaline phosphatase 90 AST 24 and ALT 46.  Ideal ALT would be less than 35.  Glucose elevated 135 and show to primary MD.  BUN 13.  Creatinine 0.96.  White blood cell count 9.2 hemoglobin 15.6 platelet count 202 and MCV 95 neutrophils noted to be normal at 6.4.  Per notes from last visit October 2020 labs showed hemoglobin 16.3 platelet count 179 AST 32 and ALT 62 iron sat was 41% and ferritin 269.  Meld 7 and meld na 7.   He did monday covid 19 booster and arm sore from shot. He said little tired. He did the moderna. He has not done the 2nd booster.  He is a  and a sore arm affects this.  He has a history of kidney stones and that has not been an issue an none x 3-4 yrs.  Oct 2020 u/s: changes of cirrhosis, and stigmata of portal hypertension seen. No focal mass in liver. Liver appears to also be fatty to them possibly. Doppler of vessels patent. Partialy visualized pancreas unremarkable.  No bile duct dilation. Gallbladder absent and common bile duct normal at 5mm. Right kidney 11cm with no hydronephrosis. Spleen enlarged 16.3cm. No ascites/fluid.  Prior 2019 scan liver fatty and nodular and spleen 19.1cm. Liver and renal cysts seen better on mri than u.s.    10-3 2020  labs: wbc 6.7 hg 16.3 and plat 179.  Glu 191 elevated and maybe not fasting and show local providers.cr 0.89.  na 139. K 4.4 and cl 103 and co2 22.  Ca 9.5. alb 4.8 and tb 0.9 and alk 85 and ast 32 and alt 62  (ideal alt less than35)  and iron sat 41% and ferritin 269.   Prior 2019 labs ast 27 and alt 51.   He is having sugar issues and he says that has been tough with the pandemic.  1/22/2019 :glu 119, creat 0.84, na 137, k 4.1, alb 4.8, carlos 2.2, t. bili 1.1, ALP 74, AST 27, ALT 51 (less than 30),  wbc 7.3, hgb 16.9, plts 206,000  7/08/2018: ALT 28, AST 18, ALP 72, t. bili 1.0  He had gained weight with stopping smoking but settling now to 202.  CT scan 01/17/2019: there are nonobstructing left renal parenchymal calculi with kidneys otherwise unremarkable, the appearance of the liver suggests diffuse fatty infiltration without focal lesion, spleen 18 x 12.0 x 7.5cm and is otherwise unrematkable, pancreas unremarkable  Encounter info: [id number], [facility], Single Visit OP, 11/3/2018 - 11/3/2018  * Final Report *  Reason For Exam *histo* abnormal liver  REPORT EXAM: MRI Abdomen w/ + w/o Contrast  CLINICAL INDICATION: *histo* abnormal liver.  TECHNIQUE: Multisequence, multiplanar MRI of the abdomen was performed without and with intravenous contrast. Written informed consent was obtained for the use of intravenous contrast.  CONTRAST: 23 cc of Prohance  COMPARISON: MR abdomen 3/10/2018  FINDINGS:  Lower Thorax: Limited images through the lung bases are unremarkable.  Liver: Morphologic changes of chronic liver disease with relative hypertrophy of the left hepatic lobe and mild surface nodularity, similar to prior. Computed fat percentage in the liver of 10.9%. No suspicious arterially enhancing lesion in the liver with washout to indicate hepatocellular carcinoma.  Gallbladder/Biliary Tree: Gallbladder not identified. No biliary ductal dilatation.  Spleen: Spleen measures 19.5 cm in length and is therefore enlarged, similar to prior.  Pancreas: No dilatation of the main pancreatic duct. Pancreatic parenchymal signal intensity is within normal limits. No suspicious focal lesion in the pancreas.  Adrenal Glands: Normal.   Kidneys/Ureters: No hydronephrosis bilaterally. No suspicious focal lesion in either kidney. The patient has a few small renal cysts measuring for example 1.0 cm upper pole right kidney series 9 image 34 and measuring 0.5 cm interpolar left kidney series 26 image 51.  Gastrointestinal: No evidence of bowel obstruction in the abdomen. Pelvic bowel loops not fully included in the field-of-view.   Lymph Nodes: No concerning lymph nodes are seen.  Vessels: Celiac trunk and superior mesenteric artery are patent. Main portal vein is patent. Mildly prominent collateral vessels are identified in the upper abdomen anteriorly.  Peritoneum/Retroperitoneum: No free fluid.  Bones/Soft Tissues: No concerning osseous lesions are seen.  IMPRESSION:      1. Similar to prior morphologic changes of chronic liver disease. No findings of hepatocellular cancer. Recommend continued 6 month MR surveillance.  2. Similar to prior sequelae of portal venous hypertension including 19 cm splenomegaly. Main portal vein is patent. No intra-abdominal ascites.   Signature Line *** Final ***  Electronically Signed By:  Mena Burnette on  11/04/2018 11:02  Plan: 1. He will keep weight off and he is being followe.d 2. Pt says he did first 2 scopes and he says waiting to hear re what the plan is. 3. Pt will stay on top of the phlebotomy and follow course. 4. U.s in 6m. 5. Pt did us and pending. 6. Rtc in 6m and u.s again.  Stressed to pt the need for social distancing and strict handwashing and wearing a mask and to follow any other new or added CDC recommendations as this is an evolving target.  Duration of the visit was  35 min with 10 min of chart prep and 25 min for this face to face visit with greater than 50% of the time spent on coordination of care  with the pt.

## 2022-04-26 NOTE — HPI-TODAY'S VISIT:
Dear Carlos Mario, April 26: u.s body of pancreas unremarkable.  Remainder pancreas not well seen due to gas and body habitus. Liver remains echogenic but with no discrete lesion. Liver vessels patent. Common bile duct normal at 4 mm. Right kidney 11.4 cm.  Spleen still enlarged at 16.5 cm.   No mention of any free fluid in abdomen. They felt that the splenomegaly was similar to prior study. Dr. Szymanski

## 2022-04-26 NOTE — EXAM-PHYSICAL EXAM
Gen: awake and responsive. Eyes: anicteric, normal lids. Mouth: covered with mask. Nose: covered with mask. Hearing: intact grossly. Neck: trachea midline and no jvd. CV: RRR no s3. Lungs: clear. No wheezes, Abd: Soft, nabs, nr, NT. Spleen trace palpable. Ext: no sig edema, some palm erythema. Neuro: moves all 4 ext grossly. No asterixis. Skin: no pruritis and some palm erythema.

## 2022-05-18 ENCOUNTER — APPOINTMENT (RX ONLY)
Dept: URBAN - METROPOLITAN AREA CLINIC 41 | Facility: CLINIC | Age: 57
Setting detail: DERMATOLOGY
End: 2022-05-18

## 2022-05-18 DIAGNOSIS — D22 MELANOCYTIC NEVI: ICD-10-CM

## 2022-05-18 DIAGNOSIS — L30.0 NUMMULAR DERMATITIS: ICD-10-CM

## 2022-05-18 DIAGNOSIS — D18.0 HEMANGIOMA: ICD-10-CM

## 2022-05-18 DIAGNOSIS — L81.4 OTHER MELANIN HYPERPIGMENTATION: ICD-10-CM

## 2022-05-18 PROBLEM — D22.5 MELANOCYTIC NEVI OF TRUNK: Status: ACTIVE | Noted: 2022-05-18

## 2022-05-18 PROBLEM — D18.01 HEMANGIOMA OF SKIN AND SUBCUTANEOUS TISSUE: Status: ACTIVE | Noted: 2022-05-18

## 2022-05-18 PROCEDURE — 99213 OFFICE O/P EST LOW 20 MIN: CPT

## 2022-05-18 PROCEDURE — ? MEDICATION COUNSELING

## 2022-05-18 PROCEDURE — ? PRESCRIPTION

## 2022-05-18 PROCEDURE — ? COUNSELING

## 2022-05-18 PROCEDURE — ? TREATMENT REGIMEN

## 2022-05-18 RX ORDER — MOMETASONE FUROATE 1 MG/G
ONE CREAM TOPICAL BID
Qty: 45 | Refills: 3 | Status: ERX | COMMUNITY
Start: 2022-05-18

## 2022-05-18 RX ADMIN — MOMETASONE FUROATE ONE: 1 CREAM TOPICAL at 00:00

## 2022-05-18 ASSESSMENT — LOCATION ZONE DERM
LOCATION ZONE: LEG
LOCATION ZONE: TRUNK
LOCATION ZONE: ARM

## 2022-05-18 ASSESSMENT — LOCATION DETAILED DESCRIPTION DERM
LOCATION DETAILED: LEFT PROXIMAL PRETIBIAL REGION
LOCATION DETAILED: LEFT DISTAL ULNAR DORSAL FOREARM
LOCATION DETAILED: LEFT MEDIAL INFERIOR CHEST
LOCATION DETAILED: RIGHT DISTAL DORSAL FOREARM
LOCATION DETAILED: INFERIOR THORACIC SPINE
LOCATION DETAILED: EPIGASTRIC SKIN
LOCATION DETAILED: RIGHT SUPERIOR MEDIAL UPPER BACK

## 2022-05-18 ASSESSMENT — LOCATION SIMPLE DESCRIPTION DERM
LOCATION SIMPLE: LEFT FOREARM
LOCATION SIMPLE: UPPER BACK
LOCATION SIMPLE: RIGHT UPPER BACK
LOCATION SIMPLE: RIGHT FOREARM
LOCATION SIMPLE: CHEST
LOCATION SIMPLE: LEFT PRETIBIAL REGION
LOCATION SIMPLE: ABDOMEN

## 2022-05-18 NOTE — PROCEDURE: MEDICATION COUNSELING
Dupixent Counseling: I discussed with the patient the risks of dupilumab including but not limited to eye infection and irritation, cold sores, injection site reactions, worsening of asthma, allergic reactions and increased risk of parasitic infection.  Live vaccines should be avoided while taking dupilumab. Dupilumab will also interact with certain medications such as warfarin and cyclosporine. The patient understands that monitoring is required and they must alert us or the primary physician if symptoms of infection or other concerning signs are noted.
Humira Counseling:  I discussed with the patient the risks of adalimumab including but not limited to myelosuppression, immunosuppression, autoimmune hepatitis, demyelinating diseases, lymphoma, and serious infections.  The patient understands that monitoring is required including a PPD at baseline and must alert us or the primary physician if symptoms of infection or other concerning signs are noted.
Klisyri Pregnancy And Lactation Text: It is unknown if this medication can harm a developing fetus or if it is excreted in breast milk.
Prednisone Counseling:  I discussed with the patient the risks of prolonged use of prednisone including but not limited to weight gain, insomnia, osteoporosis, mood changes, diabetes, susceptibility to infection, glaucoma and high blood pressure.  In cases where prednisone use is prolonged, patients should be monitored with blood pressure checks, serum glucose levels and an eye exam.  Additionally, the patient may need to be placed on GI prophylaxis, PCP prophylaxis, and calcium and vitamin D supplementation and/or a bisphosphonate.  The patient verbalized understanding of the proper use and the possible adverse effects of prednisone.  All of the patient's questions and concerns were addressed.
Carac Pregnancy And Lactation Text: This medication is Pregnancy Category X and contraindicated in pregnancy and in women who may become pregnant. It is unknown if this medication is excreted in breast milk.
Quinolones Pregnancy And Lactation Text: This medication is Pregnancy Category C and it isn't know if it is safe during pregnancy. It is also excreted in breast milk.
Topical Retinoid Pregnancy And Lactation Text: This medication is Pregnancy Category C. It is unknown if this medication is excreted in breast milk.
Use Enhanced Medication Counseling?: No
Quinolones Counseling:  I discussed with the patient the risks of fluoroquinolones including but not limited to GI upset, allergic reaction, drug rash, diarrhea, dizziness, photosensitivity, yeast infections, liver function test abnormalities, tendonitis/tendon rupture.
Taltz Counseling: I discussed with the patient the risks of ixekizumab including but not limited to immunosuppression, serious infections, worsening of inflammatory bowel disease and drug reactions.  The patient understands that monitoring is required including a PPD at baseline and must alert us or the primary physician if symptoms of infection or other concerning signs are noted.
Cosentyx Pregnancy And Lactation Text: This medication is Pregnancy Category B and is considered safe during pregnancy. It is unknown if this medication is excreted in breast milk.
Otezla Counseling: The side effects of Otezla were discussed with the patient, including but not limited to worsening or new depression, weight loss, diarrhea, nausea, upper respiratory tract infection, and headache. Patient instructed to call the office should any adverse effect occur.  The patient verbalized understanding of the proper use and possible adverse effects of Otezla.  All the patient's questions and concerns were addressed.
Doxepin Pregnancy And Lactation Text: This medication is Pregnancy Category C and it isn't known if it is safe during pregnancy. It is also excreted in breast milk and breast feeding isn't recommended.
Calcipotriene Counseling: Cantharidin Counseling:  I discussed with the patient the risks of Cantharidin including but not limited to pain, redness, burning, itching, and blistering.
Hydroxyzine Pregnancy And Lactation Text: This medication is not safe during pregnancy and should not be taken. It is also excreted in breast milk and breast feeding isn't recommended.
Arava Pregnancy And Lactation Text: This medication is Pregnancy Category X and is absolutely contraindicated during pregnancy. It is unknown if it is excreted in breast milk.
Dupixent Pregnancy And Lactation Text: This medication likely crosses the placenta but the risk for the fetus is uncertain. This medication is excreted in breast milk.
Prednisone Pregnancy And Lactation Text: This medication is Pregnancy Category C and it isn't know if it is safe during pregnancy. This medication is excreted in breast milk.
Tremfya Counseling: I discussed with the patient the risks of guselkumab including but not limited to immunosuppression, serious infections, and drug reactions.  The patient understands that monitoring is required including a PPD at baseline and must alert us or the primary physician if symptoms of infection or other concerning signs are noted.
Rifampin Counseling: I discussed with the patient the risks of rifampin including but not limited to liver damage, kidney damage, red-orange body fluids, nausea/vomiting and severe allergy.
Hydroxyzine Counseling: Patient advised that the medication is sedating and not to drive a car after taking this medication.  Patient informed of potential adverse effects including but not limited to dry mouth, urinary retention, and blurry vision.  The patient verbalized understanding of the proper use and possible adverse effects of hydroxyzine.  All of the patient's questions and concerns were addressed.
Taltz Pregnancy And Lactation Text: The risk during pregnancy and breastfeeding is uncertain with this medication.
Tazorac Counseling:  Patient advised that medication is irritating and drying.  Patient may need to apply sparingly and wash off after an hour before eventually leaving it on overnight.  The patient verbalized understanding of the proper use and possible adverse effects of tazorac.  All of the patient's questions and concerns were addressed.
Arava Counseling:  Patient counseled regarding adverse effects of Arava including but not limited to nausea, vomiting, abnormalities in liver function tests. Patients may develop mouth sores, rash, diarrhea, and abnormalities in blood counts. The patient understands that monitoring is required including LFTs and blood counts.  There is a rare possibility of scarring of the liver and lung problems that can occur when taking methotrexate. Persistent nausea, loss of appetite, pale stools, dark urine, cough, and shortness of breath should be reported immediately. Patient advised to discontinue Arava treatment and consult with a physician prior to attempting conception. The patient will have to undergo a treatment to eliminate Arava from the body prior to conception.
Otezla Pregnancy And Lactation Text: This medication is Pregnancy Category C and it isn't known if it is safe during pregnancy. It is unknown if it is excreted in breast milk.
Minoxidil Counseling: Minoxidil is a topical medication which can increase blood flow where it is applied. It is uncertain how this medication increases hair growth. Side effects are uncommon and include stinging and allergic reactions.
Rifampin Pregnancy And Lactation Text: This medication is Pregnancy Category C and it isn't know if it is safe during pregnancy. It is also excreted in breast milk and should not be used if you are breast feeding.
Oxybutynin Pregnancy And Lactation Text: This medication is Pregnancy Category B and is considered safe during pregnancy. It is unknown if it is excreted in breast milk.
Azithromycin Pregnancy And Lactation Text: This medication is considered safe during pregnancy and is also secreted in breast milk.
Mirvaso Counseling: Mirvaso is a topical medication which can decrease superficial blood flow where applied. Side effects are uncommon and include stinging, redness and allergic reactions.
Clofazimine Counseling:  I discussed with the patient the risks of clofazimine including but not limited to skin and eye pigmentation, liver damage, nausea/vomiting, gastrointestinal bleeding and allergy.
Minoxidil Pregnancy And Lactation Text: This medication has not been assigned a Pregnancy Risk Category but animal studies failed to show danger with the topical medication. It is unknown if the medication is excreted in breast milk.
Oxybutynin Counseling:  I discussed with the patient the risks of oxybutynin including but not limited to skin rash, drowsiness, dry mouth, difficulty urinating, and blurred vision.
Tazorac Pregnancy And Lactation Text: This medication is not safe during pregnancy. It is unknown if this medication is excreted in breast milk.
Ilumya Counseling: I discussed with the patient the risks of tildrakizumab including but not limited to immunosuppression, malignancy, posterior leukoencephalopathy syndrome, and serious infections.  The patient understands that monitoring is required including a PPD at baseline and must alert us or the primary physician if symptoms of infection or other concerning signs are noted.
Azithromycin Counseling:  I discussed with the patient the risks of azithromycin including but not limited to GI upset, allergic reaction, drug rash, diarrhea, and yeast infections.
Calcipotriene Pregnancy And Lactation Text: This medication has not been proven safe during pregnancy. It is unknown if this medication is excreted in breast milk.
Bactrim Counseling:  I discussed with the patient the risks of sulfa antibiotics including but not limited to GI upset, allergic reaction, drug rash, diarrhea, dizziness, photosensitivity, and yeast infections.  Rarely, more serious reactions can occur including but not limited to aplastic anemia, agranulocytosis, methemoglobinemia, blood dyscrasias, liver or kidney failure, lung infiltrates or desquamative/blistering drug rashes.
Acitretin Counseling:  I discussed with the patient the risks of acitretin including but not limited to hair loss, dry lips/skin/eyes, liver damage, hyperlipidemia, depression/suicidal ideation, photosensitivity.  Serious rare side effects can include but are not limited to pancreatitis, pseudotumor cerebri, bony changes, clot formation/stroke/heart attack.  Patient understands that alcohol is contraindicated since it can result in liver toxicity and significantly prolong the elimination of the drug by many years.
Xeljanz Counseling: I discussed with the patient the risks of Xeljanz therapy including increased risk of infection, liver issues, headache, diarrhea, or cold symptoms. Live vaccines should be avoided. They were instructed to call if they have any problems.
Clofazimine Pregnancy And Lactation Text: This medication is Pregnancy Category C and isn't considered safe during pregnancy. It is excreted in breast milk.
Infliximab Counseling:  I discussed with the patient the risks of infliximab including but not limited to myelosuppression, immunosuppression, autoimmune hepatitis, demyelinating diseases, lymphoma, and serious infections.  The patient understands that monitoring is required including a PPD at baseline and must alert us or the primary physician if symptoms of infection or other concerning signs are noted.
Albendazole Counseling:  I discussed with the patient the risks of albendazole including but not limited to cytopenia, kidney damage, nausea/vomiting and severe allergy.  The patient understands that this medication is being used in an off-label manner.
Propranolol Counseling:  I discussed with the patient the risks of propranolol including but not limited to low heart rate, low blood pressure, low blood sugar, restlessness and increased cold sensitivity. They should call the office if they experience any of these side effects.
Topical Clindamycin Counseling: Patient counseled that this medication may cause skin irritation or allergic reactions.  In the event of skin irritation, the patient was advised to reduce the amount of the drug applied or use it less frequently.   The patient verbalized understanding of the proper use and possible adverse effects of clindamycin.  All of the patient's questions and concerns were addressed.
Cantharidin Counseling: Calcipotriene Counseling:  I discussed with the patient the risks of calcipotriene including but not limited to erythema, scaling, itching, and irritation.
Sarecycline Counseling: Patient advised regarding possible photosensitivity and discoloration of the teeth, skin, lips, tongue and gums.  Patient instructed to avoid sunlight, if possible.  When exposed to sunlight, patients should wear protective clothing, sunglasses, and sunscreen.  The patient was instructed to call the office immediately if the following severe adverse effects occur:  hearing changes, easy bruising/bleeding, severe headache, or vision changes.  The patient verbalized understanding of the proper use and possible adverse effects of sarecycline.  All of the patient's questions and concerns were addressed.
Glycopyrrolate Counseling:  I discussed with the patient the risks of glycopyrrolate including but not limited to skin rash, drowsiness, dry mouth, difficulty urinating, and blurred vision.
Topical Ketoconazole Counseling: Patient counseled that this medication may cause skin irritation or allergic reactions.  In the event of skin irritation, the patient was advised to reduce the amount of the drug applied or use it less frequently.   The patient verbalized understanding of the proper use and possible adverse effects of ketoconazole.  All of the patient's questions and concerns were addressed.
Propranolol Pregnancy And Lactation Text: This medication is Pregnancy Category C and it isn't known if it is safe during pregnancy. It is excreted in breast milk.
Bactrim Pregnancy And Lactation Text: This medication is Pregnancy Category D and is known to cause fetal risk.  It is also excreted in breast milk.
Hydroxychloroquine Counseling:  I discussed with the patient that a baseline ophthalmologic exam is needed at the start of therapy and every year thereafter while on therapy. A CBC may also be warranted for monitoring.  The side effects of this medication were discussed with the patient, including but not limited to agranulocytosis, aplastic anemia, seizures, rashes, retinopathy, and liver toxicity. Patient instructed to call the office should any adverse effect occur.  The patient verbalized understanding of the proper use and possible adverse effects of Plaquenil.  All the patient's questions and concerns were addressed.
Xelsoniaz Pregnancy And Lactation Text: This medication is Pregnancy Category D and is not considered safe during pregnancy.  The risk during breast feeding is also uncertain.
5-Fu Counseling: 5-Fluorouracil Counseling:  I discussed with the patient the risks of 5-fluorouracil including but not limited to erythema, scaling, itching, weeping, crusting, and pain.
Sarecycline Pregnancy And Lactation Text: This medication is Pregnancy Category D and not consider safe during pregnancy. It is also excreted in breast milk.
Mirvaso Pregnancy And Lactation Text: This medication has not been assigned a Pregnancy Risk Category. It is unknown if the medication is excreted in breast milk.
Albendazole Pregnancy And Lactation Text: This medication is Pregnancy Category C and it isn't known if it is safe during pregnancy. It is also excreted in breast milk.
Glycopyrrolate Pregnancy And Lactation Text: This medication is Pregnancy Category B and is considered safe during pregnancy. It is unknown if it is excreted breast milk.
Cantharidin Pregnancy And Lactation Text: The use of this medication during pregnancy or lactation is not recommended as there is insufficient data.
Colchicine Counseling:  Patient counseled regarding adverse effects including but not limited to stomach upset (nausea, vomiting, stomach pain, or diarrhea).  Patient instructed to limit alcohol consumption while taking this medication.  Colchicine may reduce blood counts especially with prolonged use.  The patient understands that monitoring of kidney function and blood counts may be required, especially at baseline. The patient verbalized understanding of the proper use and possible adverse effects of colchicine.  All of the patient's questions and concerns were addressed.
Acitretin Pregnancy And Lactation Text: This medication is Pregnancy Category X and should not be given to women who are pregnant or may become pregnant in the future. This medication is excreted in breast milk.
Xolair Counseling:  Patient informed of potential adverse effects including but not limited to fever, muscle aches, rash and allergic reactions.  The patient verbalized understanding of the proper use and possible adverse effects of Xolair.  All of the patient's questions and concerns were addressed.
Rinvoq Counseling: I discussed with the patient the risks of Rinvoq therapy including but not limited to upper respiratory tract infections, shingles, cold sores, bronchitis, nausea, cough, fever, acne, and headache. Live vaccines should be avoided.  This medication has been linked to serious infections; higher rate of mortality; malignancy and lymphoproliferative disorders; major adverse cardiovascular events; thrombosis; thrombocytopenia, anemia, and neutropenia; lipid elevations; liver enzyme elevations; and gastrointestinal perforations.
Hydroxychloroquine Pregnancy And Lactation Text: This medication has been shown to cause fetal harm but it isn't assigned a Pregnancy Risk Category. There are small amounts excreted in breast milk.
Opzelura Pregnancy And Lactation Text: There is insufficient data to evaluate drug-associated risk for major birth defects, miscarriage, or other adverse maternal or fetal outcomes.  There is a pregnancy registry that monitors pregnancy outcomes in pregnant persons exposed to the medication during pregnancy.  It is unknown if this medication is excreted in breast milk.  Do not breastfeed during treatment and for about 4 weeks after the last dose.
Ivermectin Counseling:  Patient instructed to take medication on an empty stomach with a full glass of water.  Patient informed of potential adverse effects including but not limited to nausea, diarrhea, dizziness, itching, and swelling of the extremities or lymph nodes.  The patient verbalized understanding of the proper use and possible adverse effects of ivermectin.  All of the patient's questions and concerns were addressed.
Opzelura Counseling:  I discussed with the patient the risks of Opzelura including but not limited to nasopharngitis, bronchitis, ear infection, eosinophila, hives, diarrhea, folliculitis, tonsillitis, and rhinorrhea.  Taken orally, this medication has been linked to serious infections; higher rate of mortality; malignancy and lymphoproliferative disorders; major adverse cardiovascular events; thrombosis; thrombocytopenia, anemia, and neutropenia; and lipid elevations.
Tetracycline Counseling: Patient counseled regarding possible photosensitivity and increased risk for sunburn.  Patient instructed to avoid sunlight, if possible.  When exposed to sunlight, patients should wear protective clothing, sunglasses, and sunscreen.  The patient was instructed to call the office immediately if the following severe adverse effects occur:  hearing changes, easy bruising/bleeding, severe headache, or vision changes.  The patient verbalized understanding of the proper use and possible adverse effects of tetracycline.  All of the patient's questions and concerns were addressed. Patient understands to avoid pregnancy while on therapy due to potential birth defects.
Cephalexin Counseling: I counseled the patient regarding use of cephalexin as an antibiotic for prophylactic and/or therapeutic purposes. Cephalexin (commonly prescribed under brand name Keflex) is a cephalosporin antibiotic which is active against numerous classes of bacteria, including most skin bacteria. Side effects may include nausea, diarrhea, gastrointestinal upset, rash, hives, yeast infections, and in rare cases, hepatitis, kidney disease, seizures, fever, confusion, neurologic symptoms, and others. Patients with severe allergies to penicillin medications are cautioned that there is about a 10% incidence of cross-reactivity with cephalosporins. When possible, patients with penicillin allergies should use alternatives to cephalosporins for antibiotic therapy.
Bexarotene Counseling:  I discussed with the patient the risks of bexarotene including but not limited to hair loss, dry lips/skin/eyes, liver abnormalities, hyperlipidemia, pancreatitis, depression/suicidal ideation, photosensitivity, drug rash/allergic reactions, hypothyroidism, anemia, leukopenia, infection, cataracts, and teratogenicity.  Patient understands that they will need regular blood tests to check lipid profile, liver function tests, white blood cell count, thyroid function tests and pregnancy test if applicable.
Birth Control Pills Counseling: Birth Control Pill Counseling: I discussed with the patient the potential side effects of OCPs including but not limited to increased risk of stroke, heart attack, thrombophlebitis, deep venous thrombosis, hepatic adenomas, breast changes, GI upset, headaches, and depression.  The patient verbalized understanding of the proper use and possible adverse effects of OCPs. All of the patient's questions and concerns were addressed.
Picato Counseling:  I discussed with the patient the risks of Picato including but not limited to erythema, scaling, itching, weeping, crusting, and pain.
Libtayo Counseling- I discussed with the patient the risks of Libtayo including but not limited to nausea, vomiting, diarrhea, and bone or muscle pain.  The patient verbalized understanding of the proper use and possible adverse effects of Libtayo.  All of the patient's questions and concerns were addressed.
Isotretinoin Counseling: Patient should get monthly blood tests, not donate blood, not drive at night if vision affected, not share medication, and not undergo elective surgery for 6 months after tx completed. Side effects reviewed, pt to contact office should one occur.
Bexarotene Pregnancy And Lactation Text: This medication is Pregnancy Category X and should not be given to women who are pregnant or may become pregnant. This medication should not be used if you are breast feeding.
Cephalexin Pregnancy And Lactation Text: This medication is Pregnancy Category B and considered safe during pregnancy.  It is also excreted in breast milk but can be used safely for shorter doses.
Dapsone Counseling: I discussed with the patient the risks of dapsone including but not limited to hemolytic anemia, agranulocytosis, rashes, methemoglobinemia, kidney failure, peripheral neuropathy, headaches, GI upset, and liver toxicity.  Patients who start dapsone require monitoring including baseline LFTs and weekly CBCs for the first month, then every month thereafter.  The patient verbalized understanding of the proper use and possible adverse effects of dapsone.  All of the patient's questions and concerns were addressed.
Rinvoq Pregnancy And Lactation Text: Based on animal studies, Rinvoq may cause embryo-fetal harm when administered to pregnant women.  The medication should not be used in pregnancy.  Breastfeeding is not recommended during treatment and for 6 days after the last dose.
Topical Sulfur Applications Counseling: Topical Sulfur Counseling: Patient counseled that this medication may cause skin irritation or allergic reactions.  In the event of skin irritation, the patient was advised to reduce the amount of the drug applied or use it less frequently.   The patient verbalized understanding of the proper use and possible adverse effects of topical sulfur application.  All of the patient's questions and concerns were addressed.
Birth Control Pills Pregnancy And Lactation Text: This medication should be avoided if pregnant and for the first 30 days post-partum.
Xolair Pregnancy And Lactation Text: This medication is Pregnancy Category B and is considered safe during pregnancy. This medication is excreted in breast milk.
Rituxan Counseling:  I discussed with the patient the risks of Rituxan infusions. Side effects can include infusion reactions, severe drug rashes including mucocutaneous reactions, reactivation of latent hepatitis and other infections and rarely progressive multifocal leukoencephalopathy.  All of the patient's questions and concerns were addressed.
Drysol Pregnancy And Lactation Text: This medication is considered safe during pregnancy and breast feeding.
Fluconazole Counseling:  Patient counseled regarding adverse effects of fluconazole including but not limited to headache, diarrhea, nausea, upset stomach, liver function test abnormalities, taste disturbance, and stomach pain.  There is a rare possibility of liver failure that can occur when taking fluconazole.  The patient understands that monitoring of LFTs and kidney function test may be required, especially at baseline. The patient verbalized understanding of the proper use and possible adverse effects of fluconazole.  All of the patient's questions and concerns were addressed.
Isotretinoin Pregnancy And Lactation Text: This medication is Pregnancy Category X and is considered extremely dangerous during pregnancy. It is unknown if it is excreted in breast milk.
Topical Sulfur Applications Pregnancy And Lactation Text: This medication is considered safe during pregnancy and breast feeding secondary to limited systemic absorption.
Drysol Counseling:  I discussed with the patient the risks of drysol/aluminum chloride including but not limited to skin rash, itching, irritation, burning.
Clindamycin Counseling: I counseled the patient regarding use of clindamycin as an antibiotic for prophylactic and/or therapeutic purposes. Clindamycin is active against numerous classes of bacteria, including skin bacteria. Side effects may include nausea, diarrhea, gastrointestinal upset, rash, hives, yeast infections, and in rare cases, colitis.
Libtayo Pregnancy And Lactation Text: This medication is contraindicated in pregnancy and when breast feeding.
Spironolactone Counseling: Patient advised regarding risks of diarrhea, abdominal pain, hyperkalemia, birth defects (for female patients), liver toxicity and renal toxicity. The patient may need blood work to monitor liver and kidney function and potassium levels while on therapy. The patient verbalized understanding of the proper use and possible adverse effects of spironolactone.  All of the patient's questions and concerns were addressed.
Dapsone Pregnancy And Lactation Text: This medication is Pregnancy Category C and is not considered safe during pregnancy or breast feeding.
Niacinamide Counseling: I recommended taking niacin or niacinamide, also know as vitamin B3, twice daily. Recent evidence suggests that taking vitamin B3 (500 mg twice daily) can reduce the risk of actinic keratoses and non-melanoma skin cancers. Side effects of vitamin B3 include flushing and headache.
Elidel Counseling: Patient may experience a mild burning sensation during topical application. Elidel is not approved in children less than 2 years of age. There have been case reports of hematologic and skin malignancies in patients using topical calcineurin inhibitors although causality is questionable.
Dutasteride Pregnancy And Lactation Text: This medication is absolutely contraindicated in women, especially during pregnancy and breast feeding. Feminization of male fetuses is possible if taking while pregnant.
High Dose Vitamin A Counseling: Side effects reviewed, pt to contact office should one occur.
Azathioprine Pregnancy And Lactation Text: This medication is Pregnancy Category D and isn't considered safe during pregnancy. It is unknown if this medication is excreted in breast milk.
Azathioprine Counseling:  I discussed with the patient the risks of azathioprine including but not limited to myelosuppression, immunosuppression, hepatotoxicity, lymphoma, and infections.  The patient understands that monitoring is required including baseline LFTs, Creatinine, possible TPMP genotyping and weekly CBCs for the first month and then every 2 weeks thereafter.  The patient verbalized understanding of the proper use and possible adverse effects of azathioprine.  All of the patient's questions and concerns were addressed.
Wartpeel Counseling:  I discussed with the patient the risks of Wartpeel including but not limited to erythema, scaling, itching, weeping, crusting, and pain.
Dutasteride Male Counseling: Dustasteride Counseling:  I discussed with the patient the risks of use of dutasteride including but not limited to decreased libido, decreased ejaculate volume, and gynecomastia. Women who can become pregnant should not handle medication.  All of the patient's questions and concerns were addressed.
Spironolactone Pregnancy And Lactation Text: This medication can cause feminization of the male fetus and should be avoided during pregnancy. The active metabolite is also found in breast milk.
Rituxan Pregnancy And Lactation Text: This medication is Pregnancy Category C and it isn't know if it is safe during pregnancy. It is unknown if this medication is excreted in breast milk but similar antibodies are known to be excreted.
Clindamycin Pregnancy And Lactation Text: This medication can be used in pregnancy if certain situations. Clindamycin is also present in breast milk.
Protopic Counseling: Patient may experience a mild burning sensation during topical application. Protopic is not approved in children less than 2 years of age. There have been case reports of hematologic and skin malignancies in patients using topical calcineurin inhibitors although causality is questionable.
Adbry Counseling: I discussed with the patient the risks of tralokinumab including but not limited to eye infection and irritation, cold sores, injection site reactions, worsening of asthma, allergic reactions and increased risk of parasitic infection.  Live vaccines should be avoided while taking tralokinumab. The patient understands that monitoring is required and they must alert us or the primary physician if symptoms of infection or other concerning signs are noted.
Cellcept Counseling:  I discussed with the patient the risks of mycophenolate mofetil including but not limited to infection/immunosuppression, GI upset, hypokalemia, hypercholesterolemia, bone marrow suppression, lymphoproliferative disorders, malignancy, GI ulceration/bleed/perforation, colitis, interstitial lung disease, kidney failure, progressive multifocal leukoencephalopathy, and birth defects.  The patient understands that monitoring is required including a baseline creatinine and regular CBC testing. In addition, patient must alert us immediately if symptoms of infection or other concerning signs are noted.
Griseofulvin Counseling:  I discussed with the patient the risks of griseofulvin including but not limited to photosensitivity, cytopenia, liver damage, nausea/vomiting and severe allergy.  The patient understands that this medication is best absorbed when taken with a fatty meal (e.g., ice cream or french fries).
Sski Pregnancy And Lactation Text: This medication is Pregnancy Category D and isn't considered safe during pregnancy. It is excreted in breast milk.
Doxycycline Pregnancy And Lactation Text: This medication is Pregnancy Category D and not consider safe during pregnancy. It is also excreted in breast milk but is considered safe for shorter treatment courses.
High Dose Vitamin A Pregnancy And Lactation Text: High dose vitamin A therapy is contraindicated during pregnancy and breast feeding.
Erivedge Counseling- I discussed with the patient the risks of Erivedge including but not limited to nausea, vomiting, diarrhea, constipation, weight loss, changes in the sense of taste, decreased appetite, muscle spasms, and hair loss.  The patient verbalized understanding of the proper use and possible adverse effects of Erivedge.  All of the patient's questions and concerns were addressed.
Protopic Pregnancy And Lactation Text: This medication is Pregnancy Category C. It is unknown if this medication is excreted in breast milk when applied topically.
SSKI Counseling:  I discussed with the patient the risks of SSKI including but not limited to thyroid abnormalities, metallic taste, GI upset, fever, headache, acne, arthralgias, paraesthesias, lymphadenopathy, easy bleeding, arrhythmias, and allergic reaction.
Siliq Counseling:  I discussed with the patient the risks of Siliq including but not limited to new or worsening depression, suicidal thoughts and behavior, immunosuppression, malignancy, posterior leukoencephalopathy syndrome, and serious infections.  The patient understands that monitoring is required including a PPD at baseline and must alert us or the primary physician if symptoms of infection or other concerning signs are noted. There is also a special program designed to monitor depression which is required with Siliq.
Doxycycline Counseling:  Patient counseled regarding possible photosensitivity and increased risk for sunburn.  Patient instructed to avoid sunlight, if possible.  When exposed to sunlight, patients should wear protective clothing, sunglasses, and sunscreen.  The patient was instructed to call the office immediately if the following severe adverse effects occur:  hearing changes, easy bruising/bleeding, severe headache, or vision changes.  The patient verbalized understanding of the proper use and possible adverse effects of doxycycline.  All of the patient's questions and concerns were addressed.
Niacinamide Pregnancy And Lactation Text: These medications are considered safe during pregnancy.
Ketoconazole Counseling:   Patient counseled regarding improving absorption with orange juice.  Adverse effects include but are not limited to breast enlargement, headache, diarrhea, nausea, upset stomach, liver function test abnormalities, taste disturbance, and stomach pain.  There is a rare possibility of liver failure that can occur when taking ketoconazole. The patient understands that monitoring of LFTs may be required, especially at baseline. The patient verbalized understanding of the proper use and possible adverse effects of ketoconazole.  All of the patient's questions and concerns were addressed.
Erythromycin Counseling:  I discussed with the patient the risks of erythromycin including but not limited to GI upset, allergic reaction, drug rash, diarrhea, increase in liver enzymes, and yeast infections.
Terbinafine Counseling: Patient counseling regarding adverse effects of terbinafine including but not limited to headache, diarrhea, rash, upset stomach, liver function test abnormalities, itching, taste/smell disturbance, nausea, abdominal pain, and flatulence.  There is a rare possibility of liver failure that can occur when taking terbinafine.  The patient understands that a baseline LFT and kidney function test may be required. The patient verbalized understanding of the proper use and possible adverse effects of terbinafine.  All of the patient's questions and concerns were addressed.
Simponi Counseling:  I discussed with the patient the risks of golimumab including but not limited to myelosuppression, immunosuppression, autoimmune hepatitis, demyelinating diseases, lymphoma, and serious infections.  The patient understands that monitoring is required including a PPD at baseline and must alert us or the primary physician if symptoms of infection or other concerning signs are noted.
Winlevi Pregnancy And Lactation Text: This medication is considered safe during pregnancy and breastfeeding.
Adbry Pregnancy And Lactation Text: It is unknown if this medication will adversely affect pregnancy or breast feeding.
Thalidomide Counseling: I discussed with the patient the risks of thalidomide including but not limited to birth defects, anxiety, weakness, chest pain, dizziness, cough and severe allergy.
Ketoconazole Pregnancy And Lactation Text: This medication is Pregnancy Category C and it isn't know if it is safe during pregnancy. It is also excreted in breast milk and breast feeding isn't recommended.
Qbrexza Counseling:  I discussed with the patient the risks of Qbrexza including but not limited to headache, mydriasis, blurred vision, dry eyes, nasal dryness, dry mouth, dry throat, dry skin, urinary hesitation, and constipation.  Local skin reactions including erythema, burning, stinging, and itching can also occur.
Winlevi Counseling:  I discussed with the patient the risks of topical clascoterone including but not limited to erythema, scaling, itching, and stinging. Patient voiced their understanding.
Eucrisa Counseling: Patient may experience a mild burning sensation during topical application. Eucrisa is not approved in children less than 2 years of age.
Griseofulvin Pregnancy And Lactation Text: This medication is Pregnancy Category X and is known to cause serious birth defects. It is unknown if this medication is excreted in breast milk but breast feeding should be avoided.
Nsaids Counseling: NSAID Counseling: I discussed with the patient that NSAIDs should be taken with food. Prolonged use of NSAIDs can result in the development of stomach ulcers.  Patient advised to stop taking NSAIDs if abdominal pain occurs.  The patient verbalized understanding of the proper use and possible adverse effects of NSAIDs.  All of the patient's questions and concerns were addressed.
Zyclara Counseling:  I discussed with the patient the risks of imiquimod including but not limited to erythema, scaling, itching, weeping, crusting, and pain.  Patient understands that the inflammatory response to imiquimod is variable from person to person and was educated regarded proper titration schedule.  If flu-like symptoms develop, patient knows to discontinue the medication and contact us.
Erythromycin Pregnancy And Lactation Text: This medication is Pregnancy Category B and is considered safe during pregnancy. It is also excreted in breast milk.
Cyclophosphamide Counseling:  I discussed with the patient the risks of cyclophosphamide including but not limited to hair loss, hormonal abnormalities, decreased fertility, abdominal pain, diarrhea, nausea and vomiting, bone marrow suppression and infection. The patient understands that monitoring is required while taking this medication.
Odomzo Counseling- I discussed with the patient the risks of Odomzo including but not limited to nausea, vomiting, diarrhea, constipation, weight loss, changes in the sense of taste, decreased appetite, muscle spasms, and hair loss.  The patient verbalized understanding of the proper use and possible adverse effects of Odomzo.  All of the patient's questions and concerns were addressed.
Hydroquinone Counseling:  Patient advised that medication may result in skin irritation, lightening (hypopigmentation), dryness, and burning.  In the event of skin irritation, the patient was advised to reduce the amount of the drug applied or use it less frequently.  Rarely, spots that are treated with hydroquinone can become darker (pseudoochronosis).  Should this occur, patient instructed to stop medication and call the office. The patient verbalized understanding of the proper use and possible adverse effects of hydroquinone.  All of the patient's questions and concerns were addressed.
Terbinafine Pregnancy And Lactation Text: This medication is Pregnancy Category B and is considered safe during pregnancy. It is also excreted in breast milk and breast feeding isn't recommended.
Qbrexza Pregnancy And Lactation Text: There is no available data on Qbrexza use in pregnant women.  There is no available data on Qbrexza use in lactation.
Nsaids Pregnancy And Lactation Text: These medications are considered safe up to 30 weeks gestation. It is excreted in breast milk.
Cibinqo Counseling: I discussed with the patient the risks of Cibinqo therapy including but not limited to common cold, nausea, headache, cold sores, increased blood CPK levels, dizziness, UTIs, fatigue, acne, and vomitting. Live vaccines should be avoided.  This medication has been linked to serious infections; higher rate of mortality; malignancy and lymphoproliferative disorders; major adverse cardiovascular events; thrombosis; thrombocytopenia and lymphopenia; lipid elevations; and retinal detachment.
Finasteride Male Counseling: Finasteride Counseling:  I discussed with the patient the risks of use of finasteride including but not limited to decreased libido, decreased ejaculate volume, gynecomastia, and depression. Women should not handle medication.  All of the patient's questions and concerns were addressed.
Itraconazole Counseling:  I discussed with the patient the risks of itraconazole including but not limited to liver damage, nausea/vomiting, neuropathy, and severe allergy.  The patient understands that this medication is best absorbed when taken with acidic beverages such as non-diet cola or ginger ale.  The patient understands that monitoring is required including baseline LFTs and repeat LFTs at intervals.  The patient understands that they are to contact us or the primary physician if concerning signs are noted.
Detail Level: Simple
Cyclophosphamide Pregnancy And Lactation Text: This medication is Pregnancy Category D and it isn't considered safe during pregnancy. This medication is excreted in breast milk.
Skyrizi Counseling: I discussed with the patient the risks of risankizumab-rzaa including but not limited to immunosuppression, and serious infections.  The patient understands that monitoring is required including a PPD at baseline and must alert us or the primary physician if symptoms of infection or other concerning signs are noted.
Cibinqo Pregnancy And Lactation Text: It is unknown if this medication will adversely affect pregnancy or breast feeding.  You should not take this medication if you are currently pregnant or planning a pregnancy or while breastfeeding.
Rhofade Counseling: Rhofade is a topical medication which can decrease superficial blood flow where applied. Side effects are uncommon and include stinging, redness and allergic reactions.
Tranexamic Acid Counseling:  Patient advised of the small risk of bleeding problems with tranexamic acid. They were also instructed to call if they developed any nausea, vomiting or diarrhea. All of the patient's questions and concerns were addressed.
Azelaic Acid Counseling: Patient counseled that medicine may cause skin irritation and to avoid applying near the eyes.  In the event of skin irritation, the patient was advised to reduce the amount of the drug applied or use it less frequently.   The patient verbalized understanding of the proper use and possible adverse effects of azelaic acid.  All of the patient's questions and concerns were addressed.
Metronidazole Counseling:  I discussed with the patient the risks of metronidazole including but not limited to seizures, nausea/vomiting, a metallic taste in the mouth, nausea/vomiting and severe allergy.
Finasteride Pregnancy And Lactation Text: This medication is absolutely contraindicated during pregnancy. It is unknown if it is excreted in breast milk.
Solaraze Counseling:  I discussed with the patient the risks of Solaraze including but not limited to erythema, scaling, itching, weeping, crusting, and pain.
Tranexamic Acid Pregnancy And Lactation Text: It is unknown if this medication is safe during pregnancy or breast feeding.
Oral Minoxidil Counseling- I discussed with the patient the risks of oral minoxidil including but not limited to shortness of breath, swelling of the feet or ankles, dizziness, lightheadedness, unwanted hair growth and allergic reaction.  The patient verbalized understanding of the proper use and possible adverse effects of oral minoxidil.  All of the patient's questions and concerns were addressed.
Cimzia Counseling:  I discussed with the patient the risks of Cimzia including but not limited to immunosuppression, allergic reactions and infections.  The patient understands that monitoring is required including a PPD at baseline and must alert us or the primary physician if symptoms of infection or other concerning signs are noted.
Benzoyl Peroxide Counseling: Patient counseled that medicine may cause skin irritation and bleach clothing.  In the event of skin irritation, the patient was advised to reduce the amount of the drug applied or use it less frequently.   The patient verbalized understanding of the proper use and possible adverse effects of benzoyl peroxide.  All of the patient's questions and concerns were addressed.
Metronidazole Pregnancy And Lactation Text: This medication is Pregnancy Category B and considered safe during pregnancy.  It is also excreted in breast milk.
Cimetidine Counseling:  I discussed with the patient the risks of Cimetidine including but not limited to gynecomastia, headache, diarrhea, nausea, drowsiness, arrhythmias, pancreatitis, skin rashes, psychosis, bone marrow suppression and kidney toxicity.
Gabapentin Counseling: I discussed with the patient the risks of gabapentin including but not limited to dizziness, somnolence, fatigue and ataxia.
Cyclosporine Counseling:  I discussed with the patient the risks of cyclosporine including but not limited to hypertension, gingival hyperplasia,myelosuppression, immunosuppression, liver damage, kidney damage, neurotoxicity, lymphoma, and serious infections. The patient understands that monitoring is required including baseline blood pressure, CBC, CMP, lipid panel and uric acid, and then 1-2 times monthly CMP and blood pressure.
Stelara Counseling:  I discussed with the patient the risks of ustekinumab including but not limited to immunosuppression, malignancy, posterior leukoencephalopathy syndrome, and serious infections.  The patient understands that monitoring is required including a PPD at baseline and must alert us or the primary physician if symptoms of infection or other concerning signs are noted.
Solaraze Pregnancy And Lactation Text: This medication is Pregnancy Category B and is considered safe. There is some data to suggest avoiding during the third trimester. It is unknown if this medication is excreted in breast milk.
Valtrex Counseling: I discussed with the patient the risks of valacyclovir including but not limited to kidney damage, nausea, vomiting and severe allergy.  The patient understands that if the infection seems to be worsening or is not improving, they are to call.
Cimzia Pregnancy And Lactation Text: This medication crosses the placenta but can be considered safe in certain situations. Cimzia may be excreted in breast milk.
Opioid Counseling: I discussed with the patient the potential side effects of opioids including but not limited to addiction, altered mental status, and depression. I stressed avoiding alcohol, benzodiazepines, muscle relaxants and sleep aids unless specifically okayed by a physician. The patient verbalized understanding of the proper use and possible adverse effects of opioids. All of the patient's questions and concerns were addressed. They were instructed to flush the remaining pills down the toilet if they did not need them for pain.
Imiquimod Counseling:  I discussed with the patient the risks of imiquimod including but not limited to erythema, scaling, itching, weeping, crusting, and pain.  Patient understands that the inflammatory response to imiquimod is variable from person to person and was educated regarded proper titration schedule.  If flu-like symptoms develop, patient knows to discontinue the medication and contact us.
Minocycline Counseling: Patient advised regarding possible photosensitivity and discoloration of the teeth, skin, lips, tongue and gums.  Patient instructed to avoid sunlight, if possible.  When exposed to sunlight, patients should wear protective clothing, sunglasses, and sunscreen.  The patient was instructed to call the office immediately if the following severe adverse effects occur:  hearing changes, easy bruising/bleeding, severe headache, or vision changes.  The patient verbalized understanding of the proper use and possible adverse effects of minocycline.  All of the patient's questions and concerns were addressed.
Oral Minoxidil Pregnancy And Lactation Text: This medication should only be used when clearly needed if you are pregnant, attempting to become pregnant or breast feeding.
Enbrel Counseling:  I discussed with the patient the risks of etanercept including but not limited to myelosuppression, immunosuppression, autoimmune hepatitis, demyelinating diseases, lymphoma, and infections.  The patient understands that monitoring is required including a PPD at baseline and must alert us or the primary physician if symptoms of infection or other concerning signs are noted.
Klisyri Counseling:  I discussed with the patient the risks of Klisyri including but not limited to erythema, scaling, itching, weeping, crusting, and pain.
Carac Counseling:  I discussed with the patient the risks of Carac including but not limited to erythema, scaling, itching, weeping, crusting, and pain.
Methotrexate Pregnancy And Lactation Text: This medication is Pregnancy Category X and is known to cause fetal harm. This medication is excreted in breast milk.
Methotrexate Counseling:  Patient counseled regarding adverse effects of methotrexate including but not limited to nausea, vomiting, abnormalities in liver function tests. Patients may develop mouth sores, rash, diarrhea, and abnormalities in blood counts. The patient understands that monitoring is required including LFT's and blood counts.  There is a rare possibility of scarring of the liver and lung problems that can occur when taking methotrexate. Persistent nausea, loss of appetite, pale stools, dark urine, cough, and shortness of breath should be reported immediately. Patient advised to discontinue methotrexate treatment at least three months before attempting to become pregnant.  I discussed the need for folate supplements while taking methotrexate.  These supplements can decrease side effects during methotrexate treatment. The patient verbalized understanding of the proper use and possible adverse effects of methotrexate.  All of the patient's questions and concerns were addressed.
Doxepin Counseling:  Patient advised that the medication is sedating and not to drive a car after taking this medication. Patient informed of potential adverse effects including but not limited to dry mouth, urinary retention, and blurry vision.  The patient verbalized understanding of the proper use and possible adverse effects of doxepin.  All of the patient's questions and concerns were addressed.
Opioid Pregnancy And Lactation Text: These medications can lead to premature delivery and should be avoided during pregnancy. These medications are also present in breast milk in small amounts.
Benzoyl Peroxide Pregnancy And Lactation Text: This medication is Pregnancy Category C. It is unknown if benzoyl peroxide is excreted in breast milk.
Valtrex Pregnancy And Lactation Text: this medication is Pregnancy Category B and is considered safe during pregnancy. This medication is not directly found in breast milk but it's metabolite acyclovir is present.
Cosentyx Counseling:  I discussed with the patient the risks of Cosentyx including but not limited to worsening of Crohn's disease, immunosuppression, allergic reactions and infections.  The patient understands that monitoring is required including a PPD at baseline and must alert us or the primary physician if symptoms of infection or other concerning signs are noted.
Topical Retinoid counseling:  Patient advised to apply a pea-sized amount only at bedtime and wait 30 minutes after washing their face before applying.  If too drying, patient may add a non-comedogenic moisturizer. The patient verbalized understanding of the proper use and possible adverse effects of retinoids.  All of the patient's questions and concerns were addressed.

## 2022-05-18 NOTE — PROCEDURE: TREATMENT REGIMEN
Plan: His eczema looks good today. He recently had a flare up and used Mometasone which helps his flares. We will refill this today if this continues to be an issue he can let us know.
Detail Level: Zone
Continue Regimen: Mometasone cream bid to legs and arms for 1-2 weeks then as needed for flares

## 2022-10-17 ENCOUNTER — LAB OUTSIDE AN ENCOUNTER (OUTPATIENT)
Dept: URBAN - METROPOLITAN AREA CLINIC 86 | Facility: CLINIC | Age: 57
End: 2022-10-17

## 2022-10-25 ENCOUNTER — CLAIMS CREATED FROM THE CLAIM WINDOW (OUTPATIENT)
Dept: URBAN - METROPOLITAN AREA CLINIC 91 | Facility: CLINIC | Age: 57
End: 2022-10-25
Payer: COMMERCIAL

## 2022-10-25 ENCOUNTER — TELEPHONE ENCOUNTER (OUTPATIENT)
Dept: URBAN - METROPOLITAN AREA CLINIC 92 | Facility: CLINIC | Age: 57
End: 2022-10-25

## 2022-10-25 ENCOUNTER — OFFICE VISIT (OUTPATIENT)
Dept: URBAN - METROPOLITAN AREA CLINIC 86 | Facility: CLINIC | Age: 57
End: 2022-10-25
Payer: COMMERCIAL

## 2022-10-25 VITALS
BODY MASS INDEX: 29.22 KG/M2 | HEIGHT: 73 IN | DIASTOLIC BLOOD PRESSURE: 70 MMHG | SYSTOLIC BLOOD PRESSURE: 131 MMHG | TEMPERATURE: 97 F | HEART RATE: 68 BPM | WEIGHT: 220.5 LBS

## 2022-10-25 DIAGNOSIS — E83.110 HEREDITARY HEMOCHROMATOSIS: ICD-10-CM

## 2022-10-25 DIAGNOSIS — R16.0 HEPATOMEGALY: ICD-10-CM

## 2022-10-25 DIAGNOSIS — Z71.89 VACCINE COUNSELING: ICD-10-CM

## 2022-10-25 DIAGNOSIS — R16.1 SPLENOMEGALY: ICD-10-CM

## 2022-10-25 DIAGNOSIS — E11.9 TYPE 2 DIABETES MELLITUS WITHOUT COMPLICATION, WITHOUT LONG-TERM CURRENT USE OF INSULIN: ICD-10-CM

## 2022-10-25 DIAGNOSIS — K76.0 FATTY LIVER: ICD-10-CM

## 2022-10-25 DIAGNOSIS — Z90.49 HISTORY OF CHOLECYSTECTOMY: ICD-10-CM

## 2022-10-25 DIAGNOSIS — Z98.890 HISTORY OF LIVER BIOPSY: ICD-10-CM

## 2022-10-25 DIAGNOSIS — Z98.890 HISTORY OF COLONOSCOPY: ICD-10-CM

## 2022-10-25 DIAGNOSIS — N62 GYNECOMASTIA: ICD-10-CM

## 2022-10-25 DIAGNOSIS — K74.69 OTHER CIRRHOSIS OF LIVER: ICD-10-CM

## 2022-10-25 DIAGNOSIS — Z71.6 TOBACCO ABUSE COUNSELING: ICD-10-CM

## 2022-10-25 DIAGNOSIS — R93.2 ABNORMAL LIVER DIAGNOSTIC IMAGING: ICD-10-CM

## 2022-10-25 DIAGNOSIS — E66.3 OVERWEIGHT: ICD-10-CM

## 2022-10-25 DIAGNOSIS — R17 ELEVATED BILIRUBIN: ICD-10-CM

## 2022-10-25 DIAGNOSIS — K75.81 NASH (NONALCOHOLIC STEATOHEPATITIS): ICD-10-CM

## 2022-10-25 PROBLEM — 35400008: Status: ACTIVE | Noted: 2020-10-18

## 2022-10-25 PROBLEM — 238131007: Status: ACTIVE | Noted: 2020-10-18

## 2022-10-25 PROBLEM — 302914006 BARRETT ESOPHAGUS: Status: ACTIVE | Noted: 2022-10-25

## 2022-10-25 PROBLEM — 443913008: Status: ACTIVE | Noted: 2020-12-18

## 2022-10-25 PROBLEM — 428882003: Status: ACTIVE | Noted: 2020-10-18

## 2022-10-25 PROBLEM — 4754008: Status: ACTIVE | Noted: 2020-10-18

## 2022-10-25 PROCEDURE — 76705 ECHO EXAM OF ABDOMEN: CPT

## 2022-10-25 PROCEDURE — 93975 VASCULAR STUDY: CPT

## 2022-10-25 PROCEDURE — 99214 OFFICE O/P EST MOD 30 MIN: CPT

## 2022-10-25 RX ORDER — METFORMIN HYDROCHLORIDE 1000 MG/1
TAKE 1 TABLET (1,000 MG) BY ORAL ROUTE 2 TIMES PER DAY WITH MORNING AND EVENING MEALS TABLET, COATED ORAL 2
Qty: 0 | Refills: 0 | Status: ACTIVE | COMMUNITY
Start: 1900-01-01

## 2022-10-25 RX ORDER — LIDOCAINE HYDROCHLORIDE 40 MG/ML
15 ML SOLUTION TOPICAL
Qty: 300 MILLILITER | Refills: 3 | Status: ON HOLD | COMMUNITY
Start: 2022-01-20

## 2022-10-25 RX ORDER — DULAGLUTIDE 0.75 MG/.5ML
AS DIRECTED INJECTION, SOLUTION SUBCUTANEOUS
Status: ACTIVE | COMMUNITY

## 2022-10-25 RX ORDER — OMEPRAZOLE 40 MG/1
TAKE 1 CAPSULE (40 MG) BY ORAL ROUTE ONCE DAILY BEFORE A MEAL FOR 90 DAYS CAPSULE, DELAYED RELEASE PELLETS ORAL TWICE A DAY
Qty: 60 | Refills: 3 | Status: ACTIVE | COMMUNITY

## 2022-10-25 RX ORDER — ONDANSETRON 4 MG/1
1 TABLET ON THE TONGUE AND ALLOW TO DISSOLVE TABLET, ORALLY DISINTEGRATING ORAL
Qty: 30 | Refills: 6 | Status: ACTIVE | COMMUNITY
Start: 2022-01-21

## 2022-10-25 RX ORDER — GABAPENTIN 300 MG/1
TAKE 1 CAPSULE (300 MG) BY ORAL ROUTE 3 TIMES PER DAY CAPSULE ORAL ONCE A DAY
Refills: 0 | Status: ACTIVE | COMMUNITY
Start: 1900-01-01

## 2022-10-25 NOTE — HPI-TODAY'S VISIT:
The patient is a 57 year old /White male, who presents after last visit April 2022 on a prior referral from Carlos Villarreal MD, for a gastroenterology evaluation of cirrhosis complicatd by portal hypertension and splenomegaly.   A copy of this document will be sent to the referring provider.  [place]ives [place] outside [place].  He did an u.s and labs today.  April 21 2022 and wbc 7.6 hg 15.4 plat 215 andm mcv 96 and neutrophils 4.6 and lymphs 1.7 and eos 0.6 elevated.  glu 65 and bun 13 and cr 0.81 and na 140 and k 4.2 nd cl 103 and co2 22 and ca 9.8 anmd alb 4.4 and 0.8 and alk 77 and ast 34 and alt 53 (prtior ast 25 and alt 55).. Iron sat 34%.  inr 1.1.   April 26: u.s body of pancreas unremarkable.  Remainder pancreas not well seen due to gas and body habitus. Liver remains echogenic but with no discrete lesion. Liver vessels patent. Common bile duct normal at 4 mm. Right kidney 11.4 cm.  Spleen still enlarged at 16.5 cm.   No mention of any free fluid in abdomen. They felt that the splenomegaly was similar to prior study.  He did the egd with ablation and that was done start of year and later for the [place] and said and insurance issue pending for this.  December 2 2021 ultrasound shows liver to be normal in contour and diffusely increased in echogenicity but with no suspicious liver lesions. No bile duct dilation seen. Common bile duct normal at 4.5 mm. Imaged pancreas portions unremarkable with tail partially not seen due to gas. Right kidney 12.4 cm with no hydronephrosis. Spleen enlarged at 17.2 cm with no suspicious splenic lesions. Liver vessels patent. Overall liver appears to be fatty.  Prior April 2021 ultrasound showed the liver to be fatty and with an enlarged spleen.  The MRI done in 2019 saw the liver much better and showed the chronic liver disease changes and surface nodularity and advised that you had 10.9% fat.  Unfortunately u.s cannot see this as well.  October 19 labs show white blood cell count 6.2 hemoglobin 15.3 platelet count 173 MCV 94 and  neutrophils 3.9 and lymphocytes 1.4.   BUN of 14 creatinine 0.91 sodium 136 potassium 4.4 calcium 9.8 albumin 4.7 bilirubin 1.0 alk phosphatase 74 AST 25 ALT 55.  Previously AST 24 ALT 46.  Ferritin up to 280 from 186.  Iron saturation up to 52% ferritin 38.  Still in the normal range.     He has lost 25 pounds for this now.  [facility] is where he does tests with  Dr Chapa there.   When he did the tx he  lost 20 ppunds and did ivf and they saw Dr Valladares and then 1m ago and did 2nd one and did better and did pain meds in iv and nause and did better and gained some back.  He says he quit smoking and staying off that for almost a  year.  Dr Garcia removes a unit every 6m.  Sugars have been better.  April 8 2021 ultrasound shows nonvisualization of the gallbladder consistent with prior surgery.  Common bile duct normal at 4 mm.  Liver did appear to be diffusely increased in echogenicity and consistent with a possible fatty infiltration.  No focal abnormality was seen.  Imaged pancreas was unremarkable.  Right kidney 11.5 cm with no hydronephrosis.  Spleen was enlarged at 15 cm.  Liver vessels were patent.  April 2 labs showed INR 1.1 iron saturation normal at 38%.  Ferritin 186. Both down from prior labs. Sodium 140 potassium 4.0 chloride 103 CO2 22 albumin 4.5 bilirubin 1.0 alkaline phosphatase 90 AST 24 and ALT 46.  Ideal ALT would be less than 35.  Glucose elevated 135 and show to primary MD.  BUN 13.  Creatinine 0.96.  White blood cell count 9.2 hemoglobin 15.6 platelet count 202 and MCV 95 neutrophils noted to be normal at 6.4.  Per notes from last visit October 2020 labs showed hemoglobin 16.3 platelet count 179 AST 32 and ALT 62 iron sat was 41% and ferritin 269.  Meld 7 and meld na 7.   He did monday covid 19 booster and arm sore from shot. He said little tired. He did the moderna. He has not done the 2nd booster.  He is a .  He has a history of kidney stones and that has not been an issue an none x 3-4 yrs.  Oct 2020 u/s: changes of cirrhosis, and stigmata of portal hypertension seen. No focal mass in liver. Liver appears to also be fatty to them possibly. Doppler of vessels patent. Partialy visualized pancreas unremarkable.  No bile duct dilation. Gallbladder absent and common bile duct normal at 5mm. Right kidney 11cm with no hydronephrosis. Spleen enlarged 16.3cm. No ascites/fluid.  Prior 2019 scan liver fatty and nodular and spleen 19.1cm. Liver and renal cysts seen better on mri than u.s.    10-3 2020  labs: wbc 6.7 hg 16.3 and plat 179.  Glu 191 elevated and maybe not fasting and show local providers.cr 0.89.  na 139. K 4.4 and cl 103 and co2 22.  Ca 9.5. alb 4.8 and tb 0.9 and alk 85 and ast 32 and alt 62  (ideal alt less than35)  and iron sat 41% and ferritin 269.   Prior 2019 labs ast 27 and alt 51.   1/22/2019 :glu 119, creat 0.84, na 137, k 4.1, alb 4.8, carlos 2.2, t. bili 1.1, ALP 74, AST 27, ALT 51 (less than 30),  wbc 7.3, hgb 16.9, plts 206,000  7/08/2018: ALT 28, AST 18, ALP 72, t. bili 1.0  He had gained weight with stopping smoking but settling now to 202.  CT scan 01/17/2019: there are nonobstructing left renal parenchymal calculi with kidneys otherwise unremarkable, the appearance of the liver suggests diffuse fatty infiltration without focal lesion, spleen 18 x 12.0 x 7.5cm and is otherwise unrematkable, pancreas unremarkable  Encounter info: [id number], [facility], Single Visit OP, 11/3/2018 - 11/3/2018  * Final Report *  Reason For Exam *histo* abnormal liver  REPORT EXAM: MRI Abdomen w/ + w/o Contrast  CLINICAL INDICATION: *histo* abnormal liver.  TECHNIQUE: Multisequence, multiplanar MRI of the abdomen was performed without and with intravenous contrast. Written informed consent was obtained for the use of intravenous contrast.  CONTRAST: 23 cc of Prohance  COMPARISON: MR abdomen 3/10/2018  FINDINGS:  Lower Thorax: Limited images through the lung bases are unremarkable.  Liver: Morphologic changes of chronic liver disease with relative hypertrophy of the left hepatic lobe and mild surface nodularity, similar to prior. Computed fat percentage in the liver of 10.9%. No suspicious arterially enhancing lesion in the liver with washout to indicate hepatocellular carcinoma.  Gallbladder/Biliary Tree: Gallbladder not identified. No biliary ductal dilatation.  Spleen: Spleen measures 19.5 cm in length and is therefore enlarged, similar to prior.  Pancreas: No dilatation of the main pancreatic duct. Pancreatic parenchymal signal intensity is within normal limits. No suspicious focal lesion in the pancreas.  Adrenal Glands: Normal.   Kidneys/Ureters: No hydronephrosis bilaterally. No suspicious focal lesion in either kidney. The patient has a few small renal cysts measuring for example 1.0 cm upper pole right kidney series 9 image 34 and measuring 0.5 cm interpolar left kidney series 26 image 51.  Gastrointestinal: No evidence of bowel obstruction in the abdomen. Pelvic bowel loops not fully included in the field-of-view.   Lymph Nodes: No concerning lymph nodes are seen.  Vessels: Celiac trunk and superior mesenteric artery are patent. Main portal vein is patent. Mildly prominent collateral vessels are identified in the upper abdomen anteriorly.  Peritoneum/Retroperitoneum: No free fluid.  Bones/Soft Tissues: No concerning osseous lesions are seen.  IMPRESSION:      1. Similar to prior morphologic changes of chronic liver disease. No findings of hepatocellular cancer. Recommend continued 6 month MR surveillance.  2. Similar to prior sequelae of portal venous hypertension including 19 cm splenomegaly. Main portal vein is patent. No intra-abdominal ascites.   Signature Line *** Final ***  Electronically Signed By:  Mena Burnette on  11/04/2018 11:02  Plan: 1. He will keep working on the weight. 2. Pt did the u,s and pending that and the labs. 3.  He is to follow with Dr Chapa re the scopes. 4.  Pt will stay on top of the phlebotomy and follow course with Dr Garcia. 5. Pt did us and pending today. 6. Rtc in  and do the u.s again.  Stressed to pt the need for social distancing and strict handwashing and wearing a mask and to follow any other new or added CDC recommendations as this is an evolving target.  Duration of the visit was 35 min with 10 min of chart prep and 25 min for this face to face visit with greater than 50% of the time spent on coordination of care  with the pt.

## 2022-10-25 NOTE — HPI-TODAY'S VISIT:
Dear Carlos Mario, Oct 25 labs na 137 and k 4.5 and cl 103 and co2 22 and ca 9.5 and bun 12 and cr 0.95 and glu 197 elevated and please share with primary provider. Total protein 7.2 alb 4.5 and alk 69 and ast 32 and alt 41. TB 1.3 little up. Ideal alt is les than 35 so need to work on this. Weight gain likely added to the issues. PT 12.9 seconds and inr 1.13.  Neutrophils 4.73 and lymphs 1.55. Eosinophils 0.4 slightly up maybe due to some allergies. Wbc 7.3 and hg 15.9 and hct 45.8 and mcv 93.7 slightly up. May want to check b12 and folate levels with primary provider. Platelets 188 normal. Meld score low 9 and meld na 9 low so good to see. Need to work on weight diet as can as we discussed. Dr Szymanski

## 2022-10-29 ENCOUNTER — TELEPHONE ENCOUNTER (OUTPATIENT)
Dept: URBAN - METROPOLITAN AREA CLINIC 92 | Facility: CLINIC | Age: 57
End: 2022-10-29

## 2022-10-29 NOTE — HPI-TODAY'S VISIT:
Deamina Mario, Oct 25th ultrasound came back.   Partially visualized pancreas unremarkable. Increased echogenicity seen to the liver parenchyma consistent with fatty infiltration. No focal hepatic mass seen. Gallbladder surgically absent. Common bile duct normal at 5 mm. Right kidney 11.4 cm with no hydronephrosis. Spleen is enlarged at 17.2 cm. Liver vessels are patent. Overall the liver appears to be fatty with an enlarged spleen being noted.  Vessels were patent. As you recall your prior ultrasound in April suggested that the spleen was also enlarged at 16.5 cm and that measurement can vary depending on how they marked.  Liver also was echogenic then as well. The MRI that you did in 2019 was better able to show your chronic liver disease and the surface nodularity. We remain hopeful that if you can work on your weight and diet and exercise issues as we discussed that this liver imaging will continue to improve. Dr. Szymanski

## 2022-11-01 PROBLEM — 197321007 FATTY LIVER: Status: ACTIVE | Noted: 2022-11-01

## 2022-11-01 PROBLEM — 16294009 SPLENOMEGALY: Status: ACTIVE | Noted: 2021-04-19

## 2023-01-19 PROBLEM — 851000119109: Status: ACTIVE | Noted: 2020-10-18

## 2023-01-19 PROBLEM — 19943007: Status: ACTIVE | Noted: 2020-10-18

## 2023-01-19 PROBLEM — 274527008: Status: ACTIVE | Noted: 2020-10-18

## 2023-01-19 PROBLEM — 80515008: Status: ACTIVE | Noted: 2020-10-18

## 2023-01-19 PROBLEM — 313436004: Status: ACTIVE | Noted: 2020-10-18

## 2023-04-10 ENCOUNTER — LAB OUTSIDE AN ENCOUNTER (OUTPATIENT)
Dept: URBAN - METROPOLITAN AREA CLINIC 86 | Facility: CLINIC | Age: 58
End: 2023-04-10

## 2023-04-10 ENCOUNTER — TELEPHONE ENCOUNTER (OUTPATIENT)
Dept: URBAN - METROPOLITAN AREA CLINIC 23 | Facility: CLINIC | Age: 58
End: 2023-04-10

## 2023-04-15 ENCOUNTER — TELEPHONE ENCOUNTER (OUTPATIENT)
Dept: URBAN - METROPOLITAN AREA CLINIC 86 | Facility: CLINIC | Age: 58
End: 2023-04-15

## 2023-04-15 LAB
A/G RATIO: 2.1
ALBUMIN: 4.9
ALKALINE PHOSPHATASE: 79
ALT (SGPT): 61
AST (SGOT): 33
BASO (ABSOLUTE): 0.1
BASOS: 1
BILIRUBIN, TOTAL: 0.7
BUN/CREATININE RATIO: 19
BUN: 15
CALCIUM: 10
CARBON DIOXIDE, TOTAL: 21
CHLORIDE: 101
CREATININE: 0.81
EGFR: 102
EOS (ABSOLUTE): 1.1
EOS: 13
GLOBULIN, TOTAL: 2.3
GLUCOSE: 113
HEMATOCRIT: 44.1
HEMATOLOGY COMMENTS:: (no result)
HEMOGLOBIN: 15.1
IMMATURE CELLS: (no result)
IMMATURE GRANS (ABS): 0
IMMATURE GRANULOCYTES: 0
INR: 1.1
LYMPHS (ABSOLUTE): 2
LYMPHS: 23
MCH: 31.7
MCHC: 34.2
MCV: 93
MONOCYTES(ABSOLUTE): 0.6
MONOCYTES: 8
NEUTROPHILS (ABSOLUTE): 4.7
NEUTROPHILS: 55
NRBC: (no result)
PLATELETS: 198
POTASSIUM: 4.4
PROTEIN, TOTAL: 7.2
PROTHROMBIN TIME: 11.2
RBC: 4.77
RDW: 11.7
SODIUM: 138
WBC: 8.4

## 2023-04-15 NOTE — HPI-TODAY'S VISIT:
Dear Carlos Mario, April 14:  inr 1.1. Glucose 113 elevated and prior 65 and share with local providers. Bun 15 and cr 0.81 and na 138 and 4.4 and cl 101 and co2 21 and ca 10.0, alb 4.9 and tb 0.7 and alk 79 and ast 33 and alt 61.  Prior ast 34 and alt 53 so little higher. Did anything change. Wbc 8.4 and hg 15.1 and platelets 198. Mcv 93. Neutrophils 4.7 and lymph 2.0. Eosinophils 1.1 elevated from 0.6 so maybe having some allergies? We will review this more at the visit. Dr Szymanski

## 2023-04-17 ENCOUNTER — LAB OUTSIDE AN ENCOUNTER (OUTPATIENT)
Dept: URBAN - METROPOLITAN AREA CLINIC 86 | Facility: CLINIC | Age: 58
End: 2023-04-17

## 2023-04-27 ENCOUNTER — CLAIMS CREATED FROM THE CLAIM WINDOW (OUTPATIENT)
Dept: URBAN - METROPOLITAN AREA CLINIC 86 | Facility: CLINIC | Age: 58
End: 2023-04-27
Payer: COMMERCIAL

## 2023-04-27 ENCOUNTER — OFFICE VISIT (OUTPATIENT)
Dept: URBAN - METROPOLITAN AREA CLINIC 91 | Facility: CLINIC | Age: 58
End: 2023-04-27
Payer: COMMERCIAL

## 2023-04-27 VITALS
WEIGHT: 223 LBS | SYSTOLIC BLOOD PRESSURE: 137 MMHG | TEMPERATURE: 96.8 F | DIASTOLIC BLOOD PRESSURE: 75 MMHG | HEART RATE: 74 BPM | HEIGHT: 73 IN | BODY MASS INDEX: 29.55 KG/M2

## 2023-04-27 DIAGNOSIS — E11.9 TYPE 2 DIABETES MELLITUS WITHOUT COMPLICATION, WITHOUT LONG-TERM CURRENT USE OF INSULIN: ICD-10-CM

## 2023-04-27 DIAGNOSIS — E66.3 OVERWEIGHT: ICD-10-CM

## 2023-04-27 DIAGNOSIS — R17 ELEVATED BILIRUBIN: ICD-10-CM

## 2023-04-27 DIAGNOSIS — R16.0 HEPATOMEGALY: ICD-10-CM

## 2023-04-27 DIAGNOSIS — Z71.6 TOBACCO ABUSE COUNSELING: ICD-10-CM

## 2023-04-27 DIAGNOSIS — E83.110 HEREDITARY HEMOCHROMATOSIS: ICD-10-CM

## 2023-04-27 DIAGNOSIS — Z71.89 VACCINE COUNSELING: ICD-10-CM

## 2023-04-27 DIAGNOSIS — K76.0 FATTY (CHANGE OF) LIVER: ICD-10-CM

## 2023-04-27 DIAGNOSIS — N62 GYNECOMASTIA: ICD-10-CM

## 2023-04-27 DIAGNOSIS — R93.2 ABNORMAL LIVER DIAGNOSTIC IMAGING: ICD-10-CM

## 2023-04-27 DIAGNOSIS — K75.81 NASH (NONALCOHOLIC STEATOHEPATITIS): ICD-10-CM

## 2023-04-27 DIAGNOSIS — R16.1 SPLENOMEGALY: ICD-10-CM

## 2023-04-27 DIAGNOSIS — K74.69 OTHER CIRRHOSIS OF LIVER: ICD-10-CM

## 2023-04-27 DIAGNOSIS — Z98.890 HISTORY OF LIVER BIOPSY: ICD-10-CM

## 2023-04-27 DIAGNOSIS — Z90.49 HISTORY OF CHOLECYSTECTOMY: ICD-10-CM

## 2023-04-27 DIAGNOSIS — Z98.890 HISTORY OF COLONOSCOPY: ICD-10-CM

## 2023-04-27 PROCEDURE — 99214 OFFICE O/P EST MOD 30 MIN: CPT

## 2023-04-27 PROCEDURE — 76705 ECHO EXAM OF ABDOMEN: CPT

## 2023-04-27 PROCEDURE — 93975 VASCULAR STUDY: CPT

## 2023-04-27 RX ORDER — ONDANSETRON 4 MG/1
1 TABLET ON THE TONGUE AND ALLOW TO DISSOLVE TABLET, ORALLY DISINTEGRATING ORAL
Qty: 30 | Refills: 6 | Status: ACTIVE | COMMUNITY
Start: 2022-01-21

## 2023-04-27 RX ORDER — LIDOCAINE HYDROCHLORIDE 40 MG/ML
15 ML SOLUTION TOPICAL
Qty: 300 MILLILITER | Refills: 3 | Status: ON HOLD | COMMUNITY
Start: 2022-01-20

## 2023-04-27 RX ORDER — OMEPRAZOLE 40 MG/1
TAKE 1 CAPSULE (40 MG) BY ORAL ROUTE ONCE DAILY BEFORE A MEAL FOR 90 DAYS CAPSULE, DELAYED RELEASE PELLETS ORAL TWICE A DAY
Qty: 60 | Refills: 3 | Status: ACTIVE | COMMUNITY

## 2023-04-27 RX ORDER — DULAGLUTIDE 0.75 MG/.5ML
AS DIRECTED INJECTION, SOLUTION SUBCUTANEOUS
Status: ACTIVE | COMMUNITY

## 2023-04-27 RX ORDER — GABAPENTIN 300 MG/1
TAKE 1 CAPSULE (300 MG) BY ORAL ROUTE 3 TIMES PER DAY CAPSULE ORAL ONCE A DAY
Refills: 0 | Status: ACTIVE | COMMUNITY
Start: 1900-01-01

## 2023-04-27 RX ORDER — METFORMIN HYDROCHLORIDE 1000 MG/1
TAKE 1 TABLET (1,000 MG) BY ORAL ROUTE 2 TIMES PER DAY WITH MORNING AND EVENING MEALS TABLET, COATED ORAL 2
Qty: 0 | Refills: 0 | Status: ACTIVE | COMMUNITY
Start: 1900-01-01

## 2023-04-27 NOTE — HPI-TODAY'S VISIT:
The patient is a 57 year old /White male, who presents after last visit Oct 2022 on a prior referral from Carlso Villarreal MD, for a gastroenterology evaluation of cirrhosis complicatd by portal hypertension and splenomegaly.   A copy of this document will be sent to the referring provider.  Lives [place] outside [place].  April 14:  inr 1.1. Glucose 113 elevated and prior 65 and share with local providers. Says not sure if ate. Says a1c 6.8% and that is up. Bun 15 and cr 0.81 and na 138 and 4.4 and cl 101 and co2 21 and ca 10.0, alb 4.9 and tb 0.7 and alk 79 and ast 33 and alt 61.  Prior ast 34 and alt 53 so little higher. Maybe sugars driving this. Wbc 8.4 and hg 15.1 and platelets 198. Mcv 93. Neutrophils 4.7 and lymph 2.0. Eosinophils 1.1 elevated from 0.6 so maybe having some allergies? he has some pollen allergies.   U.s done and pending.  Oct 25th 2022 ultrasound came back.   Partially visualized pancreas unremarkable. Increased echogenicity seen to the liver parenchyma consistent with fatty infiltration. No focal hepatic mass seen. Gallbladder surgically absent. Common bile duct normal at 5 mm. Right kidney 11.4 cm with no hydronephrosis. Spleen is enlarged at 17.2 cm. Liver vessels are patent. Overall the liver appears to be fatty with an enlarged spleen being noted.  Vessels were patent. As you recall your prior ultrasound in April suggested that the spleen was also enlarged at 16.5 cm and that measurement can vary depending on how they marked.  Liver also was echogenic then as well.  The MRI that you did in 2019 was better able to show your chronic liver disease and the surface nodularity. We remain hopeful that if you can work on your weight and diet and exercise issues as we discussed that this liver imaging will continue to improve. , Oct 25 labs na 137 and k 4.5 and cl 103 and co2 22 and ca 9.5 and bun 12 and cr 0.95 and glu 197 elevated and please share with primary provider. Total protein 7.2 alb 4.5 and alk 69 and ast 32 and alt 41. TB 1.3 little up. Ideal alt is les than 35 so need to work on this. Weight gain likely added to the issues. PT 12.9 seconds and inr 1.13.  Neutrophils 4.73 and lymphs 1.55. Eosinophils 0.4 slightly up maybe due to some allergies. Wbc 7.3 and hg 15.9 and hct 45.8 and mcv 93.7 slightly up. May want to check b12 and folate levels with primary provider. Platelets 188 normal. Meld score low 9 and meld na 9 low so good to see. That was due to esophageal treatment and had lost 20 plus pounds and then gained that back,  Meds that are out now diff is ozempic and mounjaro, Need to prediabetic.diabetic and need to have a fatty liver and be overweight.  He for the dm sees primary and he says he spoke with them re looking at ozempic instead of trulicity.  April 21 2022 and wbc 7.6 hg 15.4 plat 215 and mcv 96 and neutrophils 4.6 and lymphs 1.7 and eos 0.6 elevated.  glu 65 and bun 13 and cr 0.81 and na 140 and k 4.2 nd cl 103 and co2 22 and ca 9.8 anmd alb 4.4 and 0.8 and alk 77 and ast 34 and alt 53 (prtior ast 25 and alt 55).. Iron sat 34%.  inr 1.1.   April 26: u.s body of pancreas unremarkable.  Remainder pancreas not well seen due to gas and body habitus. Liver remains echogenic but with no discrete lesion. Liver vessels patent. Common bile duct normal at 4 mm. Right kidney 11.4 cm.  Spleen still enlarged at 16.5 cm.   No mention of any free fluid in abdomen. They felt that the splenomegaly was similar to prior study.  He did the egd with ablation and that was done start of year and later for the [facility] and said and insurance issue pending for this.  December 2 2021 ultrasound shows liver to be normal in contour and diffusely increased in echogenicity but with no suspicious liver lesions. No bile duct dilation seen. Common bile duct normal at 4.5 mm. Imaged pancreas portions unremarkable with tail partially not seen due to gas. Right kidney 12.4 cm with no hydronephrosis. Spleen enlarged at 17.2 cm with no suspicious splenic lesions. Liver vessels patent. Overall liver appears to be fatty.  Prior April 2021 ultrasound showed the liver to be fatty and with an enlarged spleen.  The MRI done in 2019 saw the liver much better and showed the chronic liver disease changes and surface nodularity and advised that you had 10.9% fat.  Unfortunately u.s cannot see this as well.  October 19 labs show white blood cell count 6.2 hemoglobin 15.3 platelet count 173 MCV 94 and  neutrophils 3.9 and lymphocytes 1.4.   BUN of 14 creatinine 0.91 sodium 136 potassium 4.4 calcium 9.8 albumin 4.7 bilirubin 1.0 alk phosphatase 74 AST 25 ALT 55.  Previously AST 24 ALT 46.  Ferritin up to 280 from 186.  Iron saturation up to 52% ferritin 38.  Still in the normal range.     He has lost 25 pounds for this now.  [facility] is where he does tests with  Dr Chapa there.   When he did the tx he  lost 20 ppunds and did ivf and they saw Dr Valladares and then 1m ago and did 2nd one and did better and did pain meds in iv and nause and did better and gained some back.  He says he quit smoking and staying off that for almost a  year.  Dr Garcia removes a unit every 6m.  Sugars have been better.  April 8 2021 ultrasound shows nonvisualization of the gallbladder consistent with prior surgery.  Common bile duct normal at 4 mm.  Liver did appear to be diffusely increased in echogenicity and consistent with a possible fatty infiltration.  No focal abnormality was seen.  Imaged pancreas was unremarkable.  Right kidney 11.5 cm with no hydronephrosis.  Spleen was enlarged at 15 cm.  Liver vessels were patent.  April 2 labs showed INR 1.1 iron saturation normal at 38%.  Ferritin 186. Both down from prior labs. Sodium 140 potassium 4.0 chloride 103 CO2 22 albumin 4.5 bilirubin 1.0 alkaline phosphatase 90 AST 24 and ALT 46.  Ideal ALT would be less than 35.  Glucose elevated 135 and show to primary MD.  BUN 13.  Creatinine 0.96.  White blood cell count 9.2 hemoglobin 15.6 platelet count 202 and MCV 95 neutrophils noted to be normal at 6.4.  Per notes from last visit October 2020 labs showed hemoglobin 16.3 platelet count 179 AST 32 and ALT 62 iron sat was 41% and ferritin 269.  Meld 7 and meld na 7.   He did monday covid 19 booster and arm sore from shot. He said little tired. He did the moderna. He has not done the 2nd booster.  He is a .  He has a history of kidney stones and that has not been an issue an none x 3-4 yrs.  Oct 2020 u/s: changes of cirrhosis, and stigmata of portal hypertension seen. No focal mass in liver. Liver appears to also be fatty to them possibly. Doppler of vessels patent. Partialy visualized pancreas unremarkable.  No bile duct dilation. Gallbladder absent and common bile duct normal at 5mm. Right kidney 11cm with no hydronephrosis. Spleen enlarged 16.3cm. No ascites/fluid.  Prior 2019 scan liver fatty and nodular and spleen 19.1cm. Liver and renal cysts seen better on mri than u.s.    10-3 2020  labs: wbc 6.7 hg 16.3 and plat 179.  Glu 191 elevated and maybe not fasting and show local providers.cr 0.89.  na 139. K 4.4 and cl 103 and co2 22.  Ca 9.5. alb 4.8 and tb 0.9 and alk 85 and ast 32 and alt 62  (ideal alt less than35)  and iron sat 41% and ferritin 269.   Prior 2019 labs ast 27 and alt 51.   1/22/2019 :glu 119, creat 0.84, na 137, k 4.1, alb 4.8, carlos 2.2, t. bili 1.1, ALP 74, AST 27, ALT 51 (less than 30),  wbc 7.3, hgb 16.9, plts 206,000  7/08/2018: ALT 28, AST 18, ALP 72, t. bili 1.0  He had gained weight with stopping smoking but settling now to 202.  CT scan 01/17/2019: there are nonobstructing left renal parenchymal calculi with kidneys otherwise unremarkable, the appearance of the liver suggests diffuse fatty infiltration without focal lesion, spleen 18 x 12.0 x 7.5cm and is otherwise unrematkable, pancreas unremarkable  Encounter info: [id number], [facility], Single Visit OP, 11/3/2018 - 11/3/2018  * Final Report *  Reason For Exam *histo* abnormal liver  REPORT EXAM: MRI Abdomen w/ + w/o Contrast  CLINICAL INDICATION: *histo* abnormal liver.  TECHNIQUE: Multisequence, multiplanar MRI of the abdomen was performed without and with intravenous contrast. Written informed consent was obtained for the use of intravenous contrast.  CONTRAST: 23 cc of Prohance  COMPARISON: MR abdomen 3/10/2018  FINDINGS:  Lower Thorax: Limited images through the lung bases are unremarkable.  Liver: Morphologic changes of chronic liver disease with relative hypertrophy of the left hepatic lobe and mild surface nodularity, similar to prior. Computed fat percentage in the liver of 10.9%. No suspicious arterially enhancing lesion in the liver with washout to indicate hepatocellular carcinoma.  Gallbladder/Biliary Tree: Gallbladder not identified. No biliary ductal dilatation.  Spleen: Spleen measures 19.5 cm in length and is therefore enlarged, similar to prior.  Pancreas: No dilatation of the main pancreatic duct. Pancreatic parenchymal signal intensity is within normal limits. No suspicious focal lesion in the pancreas.  Adrenal Glands: Normal.   Kidneys/Ureters: No hydronephrosis bilaterally. No suspicious focal lesion in either kidney. The patient has a few small renal cysts measuring for example 1.0 cm upper pole right kidney series 9 image 34 and measuring 0.5 cm interpolar left kidney series 26 image 51.  Gastrointestinal: No evidence of bowel obstruction in the abdomen. Pelvic bowel loops not fully included in the field-of-view.   Lymph Nodes: No concerning lymph nodes are seen.  Vessels: Celiac trunk and superior mesenteric artery are patent. Main portal vein is patent. Mildly prominent collateral vessels are identified in the upper abdomen anteriorly.  Peritoneum/Retroperitoneum: No free fluid.  Bones/Soft Tissues: No concerning osseous lesions are seen.  IMPRESSION:      1. Similar to prior morphologic changes of chronic liver disease. No findings of hepatocellular cancer. Recommend continued 6 month MR surveillance.  2. Similar to prior sequelae of portal venous hypertension including 19 cm splenomegaly. Main portal vein is patent. No intra-abdominal ascites.   Signature Line *** Final ***  Electronically Signed By:  Mena Burnette on  11/04/2018 11:02  Plan: 1. He will keep working on the weight and diet and dm and exercise. 2. The switch to ozempic will likely work. 3. Need to check on the u.s that he did and follow how does on the diet and the changes. 4. He is to follow with Dr Valladares to check on the status. 5. Pt will stay on top of the phlebotomy and he is doing every 6m with Dr Garcia. 6. Rtc in 6m and do the u.s again.  Stressed to pt the need for social distancing and strict handwashing and wearing a mask and to follow any other new or added CDC recommendations as this is an evolving target.  Duration of the visit was 35 min with 10 min of chart prep and 25 min for this face to face visit with greater than 50% of the time spent on coordination of care  with the pt.

## 2023-05-10 ENCOUNTER — TELEPHONE ENCOUNTER (OUTPATIENT)
Dept: URBAN - METROPOLITAN AREA CLINIC 86 | Facility: CLINIC | Age: 58
End: 2023-05-10

## 2023-05-10 NOTE — HPI-TODAY'S VISIT:
Jing Jay, May 8 ultrasound shows partially visualized pancreas unremarkable. Diffuse increased echogenicity of the liver parenchyma seen compatible with fatty infiltration and no focal lesions. Gallbladder surgically absent with common bile duct 7 mm which is within normal limits for post gallbladder surgery state. Right kidney 11.3 cm with no hydronephrosis. Spleen is enlarged at 19.4 cm.  Last year's ultrasound suggested the spleen was 17.2 cm.   No ascites seen. Liver and splenic vessels patent. In summary, the liver is fatty appearing to them but with no focal mass seen but splenomegaly was seen.  Patent vessels were seen and common bile duct 7 mm which is within normal limits post gallbladder surgery. Dr. Szymanski

## 2023-06-20 ENCOUNTER — ERX REFILL RESPONSE (OUTPATIENT)
Dept: URBAN - METROPOLITAN AREA CLINIC 52 | Facility: CLINIC | Age: 58
End: 2023-06-20

## 2023-06-20 RX ORDER — OMEPRAZOLE 40 MG/1
TAKE 1 CAPSULE BY MOUTH EVERY DAY BEFORE A MEAL CAPSULE, DELAYED RELEASE ORAL
Qty: 90 CAPSULE | Refills: 0 | OUTPATIENT

## 2023-06-20 RX ORDER — OMEPRAZOLE 40 MG/1
TAKE 1 CAPSULE BY MOUTH EVERY DAY BEFORE A MEAL CAPSULE, DELAYED RELEASE ORAL
Qty: 90 CAPSULE | Refills: 1 | OUTPATIENT

## 2023-08-30 ENCOUNTER — OFFICE VISIT (OUTPATIENT)
Dept: URBAN - METROPOLITAN AREA CLINIC 94 | Facility: CLINIC | Age: 58
End: 2023-08-30
Payer: COMMERCIAL

## 2023-08-30 ENCOUNTER — LAB OUTSIDE AN ENCOUNTER (OUTPATIENT)
Dept: URBAN - METROPOLITAN AREA CLINIC 94 | Facility: CLINIC | Age: 58
End: 2023-08-30

## 2023-08-30 VITALS
WEIGHT: 213 LBS | TEMPERATURE: 97.5 F | DIASTOLIC BLOOD PRESSURE: 63 MMHG | SYSTOLIC BLOOD PRESSURE: 113 MMHG | HEIGHT: 73 IN | HEART RATE: 72 BPM | BODY MASS INDEX: 28.23 KG/M2

## 2023-08-30 DIAGNOSIS — K22.711 BARRETT'S ESOPHAGUS WITH HIGH GRADE DYSPLASIA: ICD-10-CM

## 2023-08-30 PROCEDURE — 99214 OFFICE O/P EST MOD 30 MIN: CPT | Performed by: INTERNAL MEDICINE

## 2023-08-30 RX ORDER — OMEPRAZOLE 40 MG/1
1 CAPSULE, DELAYED RELEASE ORAL TWICE A DAY
Qty: 180 | Refills: 3

## 2023-08-30 RX ORDER — METFORMIN HYDROCHLORIDE 1000 MG/1
TAKE 1 TABLET (1,000 MG) BY ORAL ROUTE 2 TIMES PER DAY WITH MORNING AND EVENING MEALS TABLET, COATED ORAL 2
Qty: 0 | Refills: 0 | Status: ACTIVE | COMMUNITY
Start: 1900-01-01

## 2023-08-30 RX ORDER — SEMAGLUTIDE 0.68 MG/ML
INJECTION, SOLUTION SUBCUTANEOUS
Qty: 3 MILLILITER | Status: ACTIVE | COMMUNITY

## 2023-08-30 RX ORDER — ONDANSETRON 4 MG/1
1 TABLET ON THE TONGUE AND ALLOW TO DISSOLVE TABLET, ORALLY DISINTEGRATING ORAL
Qty: 30 | Refills: 6 | Status: ACTIVE | COMMUNITY
Start: 2022-01-21

## 2023-08-30 RX ORDER — GABAPENTIN 300 MG/1
TAKE 1 CAPSULE (300 MG) BY ORAL ROUTE 3 TIMES PER DAY CAPSULE ORAL ONCE A DAY
Refills: 0 | Status: ACTIVE | COMMUNITY
Start: 1900-01-01

## 2023-08-30 RX ORDER — OMEPRAZOLE 40 MG/1
TAKE 1 CAPSULE BY MOUTH EVERY DAY BEFORE A MEAL CAPSULE, DELAYED RELEASE ORAL
Qty: 90 CAPSULE | Refills: 0 | Status: ACTIVE | COMMUNITY

## 2023-08-30 RX ORDER — DULAGLUTIDE 0.75 MG/.5ML
AS DIRECTED INJECTION, SOLUTION SUBCUTANEOUS
Status: DISCONTINUED | COMMUNITY

## 2023-08-30 RX ORDER — LIDOCAINE HYDROCHLORIDE 40 MG/ML
15 ML SOLUTION TOPICAL
Qty: 300 MILLILITER | Refills: 3 | Status: DISCONTINUED | COMMUNITY
Start: 2022-01-20

## 2023-08-30 NOTE — HPI-TODAY'S VISIT:
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 Pt subsequently had radiofrequency ablation of Lopez's esophagus by Dr Chapa in 1/2022 and 3/2022. Dr Chapa had recommended repeat EGD at a 2 month interval in 3/2022, however pt never went back for EGD. He had no followup EGD since 3/2022 Currently, GERD symptoms well controlled with omeprazole 40 mg BID Denies dysphagia or abdominal pain Recently started ozempic 8 weeks ago and has lost 5 lbs since then.

## 2023-09-20 ENCOUNTER — CLAIMS CREATED FROM THE CLAIM WINDOW (OUTPATIENT)
Dept: URBAN - METROPOLITAN AREA CLINIC 4 | Facility: CLINIC | Age: 58
End: 2023-09-20
Payer: COMMERCIAL

## 2023-09-20 ENCOUNTER — OUT OF OFFICE VISIT (OUTPATIENT)
Dept: URBAN - METROPOLITAN AREA SURGERY CENTER 17 | Facility: SURGERY CENTER | Age: 58
End: 2023-09-20
Payer: COMMERCIAL

## 2023-09-20 DIAGNOSIS — K44.9 HIATAL HERNIA: ICD-10-CM

## 2023-09-20 DIAGNOSIS — K22.9 IRREGULAR Z LINE OF ESOPHAGUS: ICD-10-CM

## 2023-09-20 DIAGNOSIS — Z87.19 HISTORY OF BARRETT'S ESOPHAGUS: ICD-10-CM

## 2023-09-20 DIAGNOSIS — K22.710 BARRETT'S ESOPHAGUS WITH LOW GRADE DYSPLASIA: ICD-10-CM

## 2023-09-20 DIAGNOSIS — K22.70 BARRETT ESOPHAGUS: ICD-10-CM

## 2023-09-20 PROCEDURE — 88341 IMHCHEM/IMCYTCHM EA ADD ANTB: CPT | Performed by: PATHOLOGY

## 2023-09-20 PROCEDURE — G8907 PT DOC NO EVENTS ON DISCHARG: HCPCS | Performed by: INTERNAL MEDICINE

## 2023-09-20 PROCEDURE — 43239 EGD BIOPSY SINGLE/MULTIPLE: CPT | Performed by: INTERNAL MEDICINE

## 2023-09-20 PROCEDURE — 88305 TISSUE EXAM BY PATHOLOGIST: CPT | Performed by: PATHOLOGY

## 2023-09-20 PROCEDURE — 00731 ANES UPR GI NDSC PX NOS: CPT | Performed by: NURSE ANESTHETIST, CERTIFIED REGISTERED

## 2023-09-20 RX ORDER — SEMAGLUTIDE 0.68 MG/ML
INJECTION, SOLUTION SUBCUTANEOUS
Qty: 3 MILLILITER | Status: ACTIVE | COMMUNITY

## 2023-09-20 RX ORDER — GABAPENTIN 300 MG/1
TAKE 1 CAPSULE (300 MG) BY ORAL ROUTE 3 TIMES PER DAY CAPSULE ORAL ONCE A DAY
Refills: 0 | Status: ACTIVE | COMMUNITY
Start: 1900-01-01

## 2023-09-20 RX ORDER — METFORMIN HYDROCHLORIDE 1000 MG/1
TAKE 1 TABLET (1,000 MG) BY ORAL ROUTE 2 TIMES PER DAY WITH MORNING AND EVENING MEALS TABLET, COATED ORAL 2
Qty: 0 | Refills: 0 | Status: ACTIVE | COMMUNITY
Start: 1900-01-01

## 2023-09-20 RX ORDER — ONDANSETRON 4 MG/1
1 TABLET ON THE TONGUE AND ALLOW TO DISSOLVE TABLET, ORALLY DISINTEGRATING ORAL
Qty: 30 | Refills: 6 | Status: ACTIVE | COMMUNITY
Start: 2022-01-21

## 2023-09-20 RX ORDER — OMEPRAZOLE 40 MG/1
1 CAPSULE, DELAYED RELEASE ORAL TWICE A DAY
Qty: 180 | Refills: 3 | Status: ACTIVE | COMMUNITY

## 2023-09-28 ENCOUNTER — WEB ENCOUNTER (OUTPATIENT)
Dept: URBAN - METROPOLITAN AREA CLINIC 94 | Facility: CLINIC | Age: 58
End: 2023-09-28

## 2023-09-29 ENCOUNTER — TELEPHONE ENCOUNTER (OUTPATIENT)
Dept: URBAN - METROPOLITAN AREA CLINIC 94 | Facility: CLINIC | Age: 58
End: 2023-09-29

## 2023-09-29 PROBLEM — 1082771000119100: Status: ACTIVE | Noted: 2023-09-29

## 2023-10-09 ENCOUNTER — LAB OUTSIDE AN ENCOUNTER (OUTPATIENT)
Dept: URBAN - METROPOLITAN AREA CLINIC 86 | Facility: CLINIC | Age: 58
End: 2023-10-09

## 2023-10-16 ENCOUNTER — LAB OUTSIDE AN ENCOUNTER (OUTPATIENT)
Dept: URBAN - METROPOLITAN AREA CLINIC 86 | Facility: CLINIC | Age: 58
End: 2023-10-16

## 2023-10-19 ENCOUNTER — OFFICE VISIT (OUTPATIENT)
Dept: URBAN - METROPOLITAN AREA CLINIC 91 | Facility: CLINIC | Age: 58
End: 2023-10-19

## 2023-10-19 ENCOUNTER — OFFICE VISIT (OUTPATIENT)
Dept: URBAN - METROPOLITAN AREA CLINIC 86 | Facility: CLINIC | Age: 58
End: 2023-10-19

## 2023-11-16 ENCOUNTER — OFFICE VISIT (OUTPATIENT)
Dept: URBAN - METROPOLITAN AREA CLINIC 86 | Facility: CLINIC | Age: 58
End: 2023-11-16

## 2023-11-16 ENCOUNTER — OFFICE VISIT (OUTPATIENT)
Dept: URBAN - METROPOLITAN AREA CLINIC 91 | Facility: CLINIC | Age: 58
End: 2023-11-16

## 2023-11-30 ENCOUNTER — OFFICE VISIT (OUTPATIENT)
Dept: URBAN - METROPOLITAN AREA MEDICAL CENTER 28 | Facility: MEDICAL CENTER | Age: 58
End: 2023-11-30
Payer: COMMERCIAL

## 2023-11-30 ENCOUNTER — LAB OUTSIDE AN ENCOUNTER (OUTPATIENT)
Dept: URBAN - METROPOLITAN AREA CLINIC 92 | Facility: CLINIC | Age: 58
End: 2023-11-30

## 2023-11-30 DIAGNOSIS — K22.89 DILATATION OF ESOPHAGUS: ICD-10-CM

## 2023-11-30 DIAGNOSIS — K22.710 BARRETT ESOPHAGUS (530.85): ICD-10-CM

## 2023-11-30 LAB
GLUCOSE POC: 153
PERFORMING LAB: (no result)

## 2023-11-30 PROCEDURE — 43270 EGD LESION ABLATION: CPT | Performed by: INTERNAL MEDICINE

## 2023-11-30 RX ORDER — SEMAGLUTIDE 0.68 MG/ML
INJECTION, SOLUTION SUBCUTANEOUS
Qty: 3 MILLILITER | Status: ACTIVE | COMMUNITY

## 2023-11-30 RX ORDER — METFORMIN HYDROCHLORIDE 1000 MG/1
TAKE 1 TABLET (1,000 MG) BY ORAL ROUTE 2 TIMES PER DAY WITH MORNING AND EVENING MEALS TABLET, COATED ORAL 2
Qty: 0 | Refills: 0 | Status: ACTIVE | COMMUNITY
Start: 1900-01-01

## 2023-11-30 RX ORDER — OMEPRAZOLE 40 MG/1
1 CAPSULE, DELAYED RELEASE ORAL TWICE A DAY
Qty: 180 | Refills: 3 | Status: ACTIVE | COMMUNITY

## 2023-11-30 RX ORDER — GABAPENTIN 300 MG/1
TAKE 1 CAPSULE (300 MG) BY ORAL ROUTE 3 TIMES PER DAY CAPSULE ORAL ONCE A DAY
Refills: 0 | Status: ACTIVE | COMMUNITY
Start: 1900-01-01

## 2023-11-30 RX ORDER — ONDANSETRON 4 MG/1
1 TABLET ON THE TONGUE AND ALLOW TO DISSOLVE TABLET, ORALLY DISINTEGRATING ORAL
Qty: 30 | Refills: 6 | Status: ACTIVE | COMMUNITY
Start: 2022-01-21

## 2024-02-23 ENCOUNTER — LAB (OUTPATIENT)
Dept: URBAN - METROPOLITAN AREA CLINIC 91 | Facility: CLINIC | Age: 59
End: 2024-02-23

## 2024-03-28 ENCOUNTER — OV EP (OUTPATIENT)
Dept: URBAN - METROPOLITAN AREA CLINIC 86 | Facility: CLINIC | Age: 59
End: 2024-03-28
Payer: COMMERCIAL

## 2024-03-28 ENCOUNTER — US W/ DOPP (OUTPATIENT)
Dept: URBAN - METROPOLITAN AREA CLINIC 91 | Facility: CLINIC | Age: 59
End: 2024-03-28
Payer: COMMERCIAL

## 2024-03-28 VITALS
DIASTOLIC BLOOD PRESSURE: 74 MMHG | HEIGHT: 73 IN | WEIGHT: 200 LBS | SYSTOLIC BLOOD PRESSURE: 131 MMHG | TEMPERATURE: 98 F | HEART RATE: 70 BPM | BODY MASS INDEX: 26.51 KG/M2

## 2024-03-28 DIAGNOSIS — Z98.890 HISTORY OF COLONOSCOPY: ICD-10-CM

## 2024-03-28 DIAGNOSIS — Z71.89 VACCINE COUNSELING: ICD-10-CM

## 2024-03-28 DIAGNOSIS — Z98.890 HISTORY OF LIVER BIOPSY: ICD-10-CM

## 2024-03-28 DIAGNOSIS — R16.0 HEPATOMEGALY: ICD-10-CM

## 2024-03-28 DIAGNOSIS — R93.2 ABNORMAL ULTRASOUND OF LIVER: ICD-10-CM

## 2024-03-28 DIAGNOSIS — E11.9 TYPE 2 DIABETES MELLITUS WITHOUT COMPLICATION, WITHOUT LONG-TERM CURRENT USE OF INSULIN: ICD-10-CM

## 2024-03-28 DIAGNOSIS — Z90.49 HISTORY OF CHOLECYSTECTOMY: ICD-10-CM

## 2024-03-28 DIAGNOSIS — N62 GYNECOMASTIA: ICD-10-CM

## 2024-03-28 DIAGNOSIS — E83.110 HEREDITARY HEMOCHROMATOSIS: ICD-10-CM

## 2024-03-28 DIAGNOSIS — R93.2 ABNORMAL LIVER DIAGNOSTIC IMAGING: ICD-10-CM

## 2024-03-28 DIAGNOSIS — E66.3 OVERWEIGHT: ICD-10-CM

## 2024-03-28 DIAGNOSIS — R16.1 SPLENOMEGALY: ICD-10-CM

## 2024-03-28 DIAGNOSIS — Z71.6 TOBACCO ABUSE COUNSELING: ICD-10-CM

## 2024-03-28 DIAGNOSIS — R17 ELEVATED BILIRUBIN: ICD-10-CM

## 2024-03-28 DIAGNOSIS — K75.81 NASH (NONALCOHOLIC STEATOHEPATITIS): ICD-10-CM

## 2024-03-28 DIAGNOSIS — K74.69 OTHER CIRRHOSIS OF LIVER: ICD-10-CM

## 2024-03-28 PROCEDURE — 99214 OFFICE O/P EST MOD 30 MIN: CPT

## 2024-03-28 PROCEDURE — 76705 ECHO EXAM OF ABDOMEN: CPT

## 2024-03-28 PROCEDURE — 93975 VASCULAR STUDY: CPT

## 2024-03-28 RX ORDER — SEMAGLUTIDE 1.34 MG/ML
AS DIRECTED INJECTION, SOLUTION SUBCUTANEOUS
Status: ACTIVE | COMMUNITY

## 2024-03-28 RX ORDER — GABAPENTIN 300 MG/1
TAKE 1 CAPSULE (300 MG) BY ORAL ROUTE 3 TIMES PER DAY CAPSULE ORAL ONCE A DAY
Refills: 0 | Status: ACTIVE | COMMUNITY
Start: 1900-01-01

## 2024-03-28 RX ORDER — ONDANSETRON 4 MG/1
1 TABLET ON THE TONGUE AND ALLOW TO DISSOLVE TABLET, ORALLY DISINTEGRATING ORAL
Qty: 30 | Refills: 6 | Status: ACTIVE | COMMUNITY
Start: 2022-01-21

## 2024-03-28 RX ORDER — METFORMIN HYDROCHLORIDE 1000 MG/1
TAKE 1 TABLET (1,000 MG) BY ORAL ROUTE 2 TIMES PER DAY WITH MORNING AND EVENING MEALS TABLET, COATED ORAL 2
Qty: 0 | Refills: 0 | Status: ACTIVE | COMMUNITY
Start: 1900-01-01

## 2024-03-28 RX ORDER — OMEPRAZOLE 40 MG/1
1 CAPSULE, DELAYED RELEASE ORAL TWICE A DAY
Qty: 180 | Refills: 3 | Status: ACTIVE | COMMUNITY

## 2024-03-28 NOTE — HPI-TODAY'S VISIT:
The patient is a 58 year old /White male, who presents after last visit april 2023  on a prior referral from Carlos Villarreal MD, for a gastroenterology evaluation of cirrhosis complicatd by portal hypertension and splenomegaly.   A copy of this document will be sent to the referring provider.  Lives [place] outside [place].  Started ozempic a few months maybe 6 or so and went 0,5 to 1.0mg.  Lost 20 pounds.   March 12: iron sat 20% and normal. Ferritin 63. Glucose 229 elevated and share with local provider. Says not sure what a1c is . Has lost some weight. Bun 10 and cr 0.90 and na 136 and k 4.0 and cl 101 and co2 20 and ca 9.7 and alb 4.6 and tb 0.8 and alk 76 and ast 35 and alt 40  and while normal get alt less than 35. Wbc 6.3 and hg 14.7 and hct 43.7 and mcv 94 and platelets 170. Neutrophils 4.5 and lymphs 1.1. Inr 1.1. Meld 7 and meld na 7 so staying low.  Did an u.s and pending.  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  Pt subsequently had radiofrequency ablation of Lopez's esophagus by Dr Chapa in 1/2022 and 3/2022. Dr Chapa had recommended repeat EGD at a 2 month interval in 3/2022.  He did a follow up with Dr Chapa and needing to do more treatments.  Currently, GERD symptoms well controlled with omeprazole 40 mg BID  Denies dysphagia or abdominal pain  May 8 2023  ultrasound shows partially visualized pancreas unremarkable. Diffuse increased echogenicity of the liver parenchyma seen compatible with fatty infiltration and no focal lesions. Gallbladder surgically absent with common bile duct 7 mm which is within normal limits for post gallbladder surgery state. Right kidney 11.3 cm with no hydronephrosis. Spleen is enlarged at 19.4 cm. Last year's ultrasound suggested the spleen was 17.2 cm. No ascites seen. Liver and splenic vessels patent. In summary, the liver is fatty appearing to them but with no focal mass seen but splenomegaly was seen. Patent vessels were seen and common bile duct 7 mm which is within normal limits post gallbladder surgery.  Says calcium test was 2800 for the heart and cards started on med for this and to resee.  Started on statin now and a blood thinner.  April 14:  inr 1.1. Glucose 113 elevated and prior 65 and share with local providers. Says not sure if ate. Says a1c 6.8% and that is up. Bun 15 and cr 0.81 and na 138 and 4.4 and cl 101 and co2 21 and ca 10.0, alb 4.9 and tb 0.7 and alk 79 and ast 33 and alt 61.  Prior ast 34 and alt 53 so little higher. Maybe sugars driving this. Wbc 8.4 and hg 15.1 and platelets 198. Mcv 93. Neutrophils 4.7 and lymph 2.0. Eosinophils 1.1 elevated from 0.6 so maybe having some allergies? he has some pollen allergies.   Oct 25th 2022 ultrasound came back.   Partially visualized pancreas unremarkable. Increased echogenicity seen to the liver parenchyma consistent with fatty infiltration. No focal hepatic mass seen. Gallbladder surgically absent. Common bile duct normal at 5 mm. Right kidney 11.4 cm with no hydronephrosis. Spleen is enlarged at 17.2 cm. Liver vessels are patent. Overall the liver appears to be fatty with an enlarged spleen being noted.  Vessels were patent. As you recall your prior ultrasound in April suggested that the spleen was also enlarged at 16.5 cm and that measurement can vary depending on how they marked.  Liver also was echogenic then as well.  The MRI that you did in 2019 was better able to show your chronic liver disease and the surface nodularity. We remain hopeful that if you can work on your weight and diet and exercise issues as we discussed that this liver imaging will continue to improve. , Oct 25 labs na 137 and k 4.5 and cl 103 and co2 22 and ca 9.5 and bun 12 and cr 0.95 and glu 197 elevated and please share with primary provider. Total protein 7.2 alb 4.5 and alk 69 and ast 32 and alt 41. TB 1.3 little up. Ideal alt is les than 35 so need to work on this. Weight gain likely added to the issues. PT 12.9 seconds and inr 1.13.  Neutrophils 4.73 and lymphs 1.55. Eosinophils 0.4 slightly up maybe due to some allergies. Wbc 7.3 and hg 15.9 and hct 45.8 and mcv 93.7 slightly up. May want to check b12 and folate levels with primary provider. Platelets 188 normal. Meld score low 9 and meld na 9 low so good to see. That was due to esophageal treatment and had lost 20 plus pounds and then gained that back,  Meds that are out now diff is ozempic and mounjaro, Need to prediabetic.diabetic and need to have a fatty liver and be overweight.  He for the dm sees primary and he says he spoke with them re looking at ozempic instead of trulicity.  April 21 2022 and wbc 7.6 hg 15.4 plat 215 and mcv 96 and neutrophils 4.6 and lymphs 1.7 and eos 0.6 elevated.  glu 65 and bun 13 and cr 0.81 and na 140 and k 4.2 nd cl 103 and co2 22 and ca 9.8 anmd alb 4.4 and 0.8 and alk 77 and ast 34 and alt 53 (prtior ast 25 and alt 55).. Iron sat 34%.  inr 1.1.   April 26: u.s body of pancreas unremarkable.  Remainder pancreas not well seen due to gas and body habitus. Liver remains echogenic but with no discrete lesion. Liver vessels patent. Common bile duct normal at 4 mm. Right kidney 11.4 cm.  Spleen still enlarged at 16.5 cm.   No mention of any free fluid in abdomen. They felt that the splenomegaly was similar to prior study.  He did the egd with ablation and that was done start of year and later for the [facility] and said and insurance issue pending for this.  December 2 2021 ultrasound shows liver to be normal in contour and diffusely increased in echogenicity but with no suspicious liver lesions. No bile duct dilation seen. Common bile duct normal at 4.5 mm. Imaged pancreas portions unremarkable with tail partially not seen due to gas. Right kidney 12.4 cm with no hydronephrosis. Spleen enlarged at 17.2 cm with no suspicious splenic lesions. Liver vessels patent. Overall liver appears to be fatty.  Prior April 2021 ultrasound showed the liver to be fatty and with an enlarged spleen.  The MRI done in 2019 saw the liver much better and showed the chronic liver disease changes and surface nodularity and advised that you had 10.9% fat.  Unfortunately u.s cannot see this as well.  October 19 labs show white blood cell count 6.2 hemoglobin 15.3 platelet count 173 MCV 94 and  neutrophils 3.9 and lymphocytes 1.4.   BUN of 14 creatinine 0.91 sodium 136 potassium 4.4 calcium 9.8 albumin 4.7 bilirubin 1.0 alk phosphatase 74 AST 25 ALT 55.  Previously AST 24 ALT 46.  Ferritin up to 280 from 186.  Iron saturation up to 52% ferritin 38.  Still in the normal range.     He has lost 25 pounds for this now.  [facility] is where he does tests with  Dr Chapa there.   When he did the tx he  lost 20 ppunds and did ivf and they saw Dr Valladares and then 1m ago and did 2nd one and did better and did pain meds in iv and nause and did better and gained some back.  He says he quit smoking and staying off that for almost a  year.  Dr Garcia removes a unit every 6m.  Sugars have been better.  April 8 2021 ultrasound shows nonvisualization of the gallbladder consistent with prior surgery.  Common bile duct normal at 4 mm.  Liver did appear to be diffusely increased in echogenicity and consistent with a possible fatty infiltration.  No focal abnormality was seen.  Imaged pancreas was unremarkable.  Right kidney 11.5 cm with no hydronephrosis.  Spleen was enlarged at 15 cm.  Liver vessels were patent.  April 2 labs showed INR 1.1 iron saturation normal at 38%.  Ferritin 186. Both down from prior labs. Sodium 140 potassium 4.0 chloride 103 CO2 22 albumin 4.5 bilirubin 1.0 alkaline phosphatase 90 AST 24 and ALT 46.  Ideal ALT would be less than 35.  Glucose elevated 135 and show to primary MD.  BUN 13.  Creatinine 0.96.  White blood cell count 9.2 hemoglobin 15.6 platelet count 202 and MCV 95 neutrophils noted to be normal at 6.4.  Per notes from last visit October 2020 labs showed hemoglobin 16.3 platelet count 179 AST 32 and ALT 62 iron sat was 41% and ferritin 269.  Meld 7 and meld na 7.   He did monday covid 19 booster and arm sore from shot. He said little tired. He did the moderna. He has not done the 2nd booster.  He is a .  He has a history of kidney stones and that has not been an issue an none x 3-4 yrs.  Oct 2020 u/s: changes of cirrhosis, and stigmata of portal hypertension seen. No focal mass in liver. Liver appears to also be fatty to them possibly. Doppler of vessels patent. Partialy visualized pancreas unremarkable.  No bile duct dilation. Gallbladder absent and common bile duct normal at 5mm. Right kidney 11cm with no hydronephrosis. Spleen enlarged 16.3cm. No ascites/fluid.  Prior 2019 scan liver fatty and nodular and spleen 19.1cm. Liver and renal cysts seen better on mri than u.s.    10-3 2020  labs: wbc 6.7 hg 16.3 and plat 179.  Glu 191 elevated and maybe not fasting and show local providers.cr 0.89.  na 139. K 4.4 and cl 103 and co2 22.  Ca 9.5. alb 4.8 and tb 0.9 and alk 85 and ast 32 and alt 62  (ideal alt less than35)  and iron sat 41% and ferritin 269.   Prior 2019 labs ast 27 and alt 51.   1/22/2019 :glu 119, creat 0.84, na 137, k 4.1, alb 4.8, carlos 2.2, t. bili 1.1, ALP 74, AST 27, ALT 51 (less than 30),  wbc 7.3, hgb 16.9, plts 206,000  7/08/2018: ALT 28, AST 18, ALP 72, t. bili 1.0  He had gained weight with stopping smoking but settling now to 202.  CT scan 01/17/2019: there are nonobstructing left renal parenchymal calculi with kidneys otherwise unremarkable, the appearance of the liver suggests diffuse fatty infiltration without focal lesion, spleen 18 x 12.0 x 7.5cm and is otherwise unrematkable, pancreas unremarkable  Encounter info: [id number], [facility], Single Visit OP, 11/3/2018 - 11/3/2018  * Final Report *  Reason For Exam *histo* abnormal liver  REPORT EXAM: MRI Abdomen w/ + w/o Contrast  CLINICAL INDICATION: *histo* abnormal liver.  TECHNIQUE: Multisequence, multiplanar MRI of the abdomen was performed without and with intravenous contrast. Written informed consent was obtained for the use of intravenous contrast.  CONTRAST: 23 cc of Prohance  COMPARISON: MR abdomen 3/10/2018  FINDINGS:  Lower Thorax: Limited images through the lung bases are unremarkable.  Liver: Morphologic changes of chronic liver disease with relative hypertrophy of the left hepatic lobe and mild surface nodularity, similar to prior. Computed fat percentage in the liver of 10.9%. No suspicious arterially enhancing lesion in the liver with washout to indicate hepatocellular carcinoma.  Gallbladder/Biliary Tree: Gallbladder not identified. No biliary ductal dilatation.  Spleen: Spleen measures 19.5 cm in length and is therefore enlarged, similar to prior.  Pancreas: No dilatation of the main pancreatic duct. Pancreatic parenchymal signal intensity is within normal limits. No suspicious focal lesion in the pancreas.  Adrenal Glands: Normal.   Kidneys/Ureters: No hydronephrosis bilaterally. No suspicious focal lesion in either kidney. The patient has a few small renal cysts measuring for example 1.0 cm upper pole right kidney series 9 image 34 and measuring 0.5 cm interpolar left kidney series 26 image 51.  Gastrointestinal: No evidence of bowel obstruction in the abdomen. Pelvic bowel loops not fully included in the field-of-view.   Lymph Nodes: No concerning lymph nodes are seen.  Vessels: Celiac trunk and superior mesenteric artery are patent. Main portal vein is patent. Mildly prominent collateral vessels are identified in the upper abdomen anteriorly.  Peritoneum/Retroperitoneum: No free fluid.  Bones/Soft Tissues: No concerning osseous lesions are seen.  IMPRESSION:      1. Similar to prior morphologic changes of chronic liver disease. No findings of hepatocellular cancer. Recommend continued 6 month MR surveillance.  2. Similar to prior sequelae of portal venous hypertension including 19 cm splenomegaly. Main portal vein is patent. No intra-abdominal ascites.   Signature Line *** Final ***  Electronically Signed By:  Mena Burnette on  11/04/2018 11:02  Plan: 1. He will keep working on the weight and diet and dm and exercise. Ozempic is helping as lost 20 pounds.  2. Need to check on u.s that he did. 3. Need to Dr Chapa re the egd, 4. Pt will see us in 6m and redo tests. 5. Still doing his phlebotomy with Dr Garcia.   Duration of the visit was 35  min with 10 min of chart prep and 25 min for this face to face visit with  time spent on coordination of care  with the pt.

## 2024-04-15 NOTE — PHYSICAL EXAM GASTROINTESTINAL
Abdomen , soft, nontender, nondistended , no guarding or rigidity , no masses palpable , normal bowel sounds , Liver and Spleen , no hepatomegaly present , no hepatosplenomegaly , liver nontender , spleen not palpable non-verbal indicators present

## 2024-07-09 ENCOUNTER — ERX REFILL RESPONSE (OUTPATIENT)
Dept: URBAN - METROPOLITAN AREA CLINIC 52 | Facility: CLINIC | Age: 59
End: 2024-07-09

## 2024-07-09 RX ORDER — OMEPRAZOLE 40 MG/1
TAKE ONE CAPSULE BY MOUTH TWICE DAILY CAPSULE, DELAYED RELEASE ORAL
Qty: 180 CAPSULE | Refills: 3 | OUTPATIENT

## 2024-07-09 RX ORDER — OMEPRAZOLE 40 MG/1
1 CAPSULE, DELAYED RELEASE ORAL TWICE A DAY
Qty: 180 | Refills: 3 | OUTPATIENT

## 2024-09-16 ENCOUNTER — LAB OUTSIDE AN ENCOUNTER (OUTPATIENT)
Dept: URBAN - METROPOLITAN AREA CLINIC 86 | Facility: CLINIC | Age: 59
End: 2024-09-16

## 2024-09-23 ENCOUNTER — LAB OUTSIDE AN ENCOUNTER (OUTPATIENT)
Dept: URBAN - METROPOLITAN AREA CLINIC 86 | Facility: CLINIC | Age: 59
End: 2024-09-23

## 2024-09-24 ENCOUNTER — OFFICE VISIT (OUTPATIENT)
Dept: URBAN - METROPOLITAN AREA CLINIC 86 | Facility: CLINIC | Age: 59
End: 2024-09-24
Payer: COMMERCIAL

## 2024-09-24 ENCOUNTER — DASHBOARD ENCOUNTERS (OUTPATIENT)
Age: 59
End: 2024-09-24

## 2024-09-24 ENCOUNTER — OFFICE VISIT (OUTPATIENT)
Dept: URBAN - METROPOLITAN AREA CLINIC 91 | Facility: CLINIC | Age: 59
End: 2024-09-24
Payer: COMMERCIAL

## 2024-09-24 VITALS
WEIGHT: 197 LBS | TEMPERATURE: 96.8 F | BODY MASS INDEX: 26.11 KG/M2 | DIASTOLIC BLOOD PRESSURE: 68 MMHG | HEART RATE: 85 BPM | SYSTOLIC BLOOD PRESSURE: 117 MMHG | HEIGHT: 73 IN

## 2024-09-24 DIAGNOSIS — R93.2 ABNORMAL LIVER DIAGNOSTIC IMAGING: ICD-10-CM

## 2024-09-24 DIAGNOSIS — Z71.89 VACCINE COUNSELING: ICD-10-CM

## 2024-09-24 DIAGNOSIS — K76.0 FATTY (CHANGE OF) LIVER: ICD-10-CM

## 2024-09-24 DIAGNOSIS — Z71.6 TOBACCO ABUSE COUNSELING: ICD-10-CM

## 2024-09-24 DIAGNOSIS — R16.0 HEPATOMEGALY: ICD-10-CM

## 2024-09-24 DIAGNOSIS — K74.69 OTHER CIRRHOSIS OF LIVER: ICD-10-CM

## 2024-09-24 DIAGNOSIS — R17 ELEVATED BILIRUBIN: ICD-10-CM

## 2024-09-24 DIAGNOSIS — Z98.890 HISTORY OF COLONOSCOPY: ICD-10-CM

## 2024-09-24 DIAGNOSIS — E83.110 HEREDITARY HEMOCHROMATOSIS: ICD-10-CM

## 2024-09-24 DIAGNOSIS — E11.9 TYPE 2 DIABETES MELLITUS WITHOUT COMPLICATION, WITHOUT LONG-TERM CURRENT USE OF INSULIN: ICD-10-CM

## 2024-09-24 DIAGNOSIS — K75.81 NASH (NONALCOHOLIC STEATOHEPATITIS): ICD-10-CM

## 2024-09-24 DIAGNOSIS — Z90.49 HISTORY OF CHOLECYSTECTOMY: ICD-10-CM

## 2024-09-24 DIAGNOSIS — Z98.890 HISTORY OF LIVER BIOPSY: ICD-10-CM

## 2024-09-24 DIAGNOSIS — N62 GYNECOMASTIA: ICD-10-CM

## 2024-09-24 DIAGNOSIS — R16.1 SPLENOMEGALY: ICD-10-CM

## 2024-09-24 DIAGNOSIS — E66.3 OVERWEIGHT: ICD-10-CM

## 2024-09-24 PROCEDURE — 99214 OFFICE O/P EST MOD 30 MIN: CPT

## 2024-09-24 PROCEDURE — 76705 ECHO EXAM OF ABDOMEN: CPT

## 2024-09-24 PROCEDURE — 93975 VASCULAR STUDY: CPT

## 2024-09-24 RX ORDER — GABAPENTIN 300 MG/1
TAKE 1 CAPSULE (300 MG) BY ORAL ROUTE 3 TIMES PER DAY CAPSULE ORAL ONCE A DAY
Refills: 0 | Status: ACTIVE | COMMUNITY
Start: 1900-01-01

## 2024-09-24 RX ORDER — METFORMIN HYDROCHLORIDE 1000 MG/1
TAKE 1 TABLET (1,000 MG) BY ORAL ROUTE 2 TIMES PER DAY WITH MORNING AND EVENING MEALS TABLET, COATED ORAL 2
Qty: 0 | Refills: 0 | Status: ACTIVE | COMMUNITY
Start: 1900-01-01

## 2024-09-24 RX ORDER — SEMAGLUTIDE 1.34 MG/ML
AS DIRECTED INJECTION, SOLUTION SUBCUTANEOUS
Status: DISCONTINUED | COMMUNITY

## 2024-09-24 RX ORDER — OMEPRAZOLE 40 MG/1
TAKE ONE CAPSULE BY MOUTH TWICE DAILY CAPSULE, DELAYED RELEASE ORAL
Qty: 180 CAPSULE | Refills: 3 | Status: ACTIVE | COMMUNITY

## 2024-09-24 RX ORDER — SEMAGLUTIDE 0.68 MG/ML
INJECTION, SOLUTION SUBCUTANEOUS
Qty: 3 MILLILITER | Status: ACTIVE | COMMUNITY

## 2024-09-24 RX ORDER — SEMAGLUTIDE 1.34 MG/ML
AS DIRECTED INJECTION, SOLUTION SUBCUTANEOUS
Status: ACTIVE | COMMUNITY

## 2024-09-24 RX ORDER — ONDANSETRON 4 MG/1
1 TABLET ON THE TONGUE AND ALLOW TO DISSOLVE TABLET, ORALLY DISINTEGRATING ORAL
Qty: 30 | Refills: 6 | Status: ACTIVE | COMMUNITY
Start: 2022-01-21

## 2024-09-24 RX ORDER — SEMAGLUTIDE 2.68 MG/ML
AS DIRECTED INJECTION, SOLUTION SUBCUTANEOUS
Status: ACTIVE | COMMUNITY

## 2024-09-24 RX ORDER — METOPROLOL SUCCINATE 25 MG/1
1 TABLET TABLET, FILM COATED, EXTENDED RELEASE ORAL ONCE A DAY
Status: ACTIVE | COMMUNITY

## 2024-09-24 RX ORDER — APIXABAN 5 MG/1
AS DIRECTED TABLET, FILM COATED ORAL TWICE A DAY
Status: ACTIVE | COMMUNITY

## 2024-09-24 RX ORDER — SEMAGLUTIDE 0.68 MG/ML
INJECTION, SOLUTION SUBCUTANEOUS
Qty: 3 MILLILITER | Status: DISCONTINUED | COMMUNITY

## 2024-09-24 NOTE — HPI-TODAY'S VISIT:
Pt is a 59 year old /White male, who presents after last visit March2024  on a prior referral from Carlos Villarreal MD, for a gastroenterology evaluation of cirrhosis complicatd by portal hypertension and splenomegaly.   A copy of this document will be sent to the referring provider.  Pt did labs and u.s today and pending.  Pt open heart quad bypass and did stress test and abn and did calcium score and 2800 and they assess and did that and Keenan ALVES. Dr Wm Montemayor.  Prior he had started ozempic and 183 to 184 in July with that 25 pounds. Stil melita the oempic but gaine 10 as not able as he is a  and not able. [facility]   March 28 final ultrasound shows that the pancreas visualized portions appeared unremarkable. The liver had increased hepatic parenchymal echogenicity but no suspicious lesions. Common bile duct was normal at 3 mm. Prior cholecystectomy changes were seen. Right kidney was 11.1 cm with no hydronephrosis. Liver and splenic vessels were patent. The spleen was mildly enlarged at 14.6 cm as we discussed. In summary, they felt that the increased liver echogenicity could be due to steatosis versus chronic liver disease. No suspicious lesions were seen. Liver vessels were patent with appropriate flow. Postcholecystectomy changes were seen and splenomegaly as well. As we discussed your May 2023 ultrasound showed the liver had diffuse increased echogenicity but no lesions in the spleen was actually larger appearing than 19.4 cm which was unusually large for you compared to even the prior year at 17.2 cm. Your last MRI in 2019 had suggested that you had chronic liver disease and surface nodularity so it is possible that we may benefit from talking about doing an MRI if this does not improve any further to see this better.  March 12: iron sat 20% and normal. Ferritin 63. Glucose 229 elevated and share with local provider. Says not sure what a1c is . Has lost some weight. Bun 10 and cr 0.90 and na 136 and k 4.0 and cl 101 and co2 20 and ca 9.7 and alb 4.6 and tb 0.8 and alk 76 and ast 35 and alt 40  and while normal get alt less than 35. Wbc 6.3 and hg 14.7 and hct 43.7 and mcv 94 and platelets 170. Neutrophils 4.5 and lymphs 1.1. Inr 1.1. Meld 7 and meld na 7 so staying low. Fib 4 1.99 and so is intermediate risk score and score 2.67 high and sorres that are less than 1.3 are low.   He did egd at end of last year.  Dr Garcia is doign phlebotomy every 6m .  Nov 2023 [place] and dR Chapa rfa and he recommended a 2 m relook and not done and needs to see to get that done and need to be sure ok with the cabg.  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  Pt subsequently had radiofrequency ablation of Lopez's esophagus by Dr Chapa in 1/2022 and 3/2022. Dr Chapa had recommended repeat EGD at a 2 month interval in 3/2022.  GERD symptoms well controlled with omeprazole 40 mg BID  Denies dysphagia or abdominal pain  May 8 2023  ultrasound shows partially visualized pancreas unremarkable. Diffuse increased echogenicity of the liver parenchyma seen compatible with fatty infiltration and no focal lesions. Gallbladder surgically absent with common bile duct 7 mm which is within normal limits for post gallbladder surgery state. Right kidney 11.3 cm with no hydronephrosis. Spleen is enlarged at 19.4 cm. Last year's ultrasound suggested the spleen was 17.2 cm. No ascites seen. Liver and splenic vessels patent. In summary, the liver is fatty appearing to them but with no focal mass seen but splenomegaly was seen. Patent vessels were seen and common bile duct 7 mm which is within normal limits post gallbladder surgery.  Says calcium test was 2800 for the heart and cards started on med for this and to resee.  Started on statin now and a blood thinner.  April 14:  inr 1.1. Glucose 113 elevated and prior 65 and share with local providers. Says not sure if ate. Says a1c 6.8% and that is up. Bun 15 and cr 0.81 and na 138 and 4.4 and cl 101 and co2 21 and ca 10.0, alb 4.9 and tb 0.7 and alk 79 and ast 33 and alt 61.  Prior ast 34 and alt 53 so little higher. Maybe sugars driving this. Wbc 8.4 and hg 15.1 and platelets 198. Mcv 93. Neutrophils 4.7 and lymph 2.0. Eosinophils 1.1 elevated from 0.6 so maybe having some allergies? he has some pollen allergies.   Oct 25th 2022 ultrasound came back.   Partially visualized pancreas unremarkable. Increased echogenicity seen to the liver parenchyma consistent with fatty infiltration. No focal hepatic mass seen. Gallbladder surgically absent. Common bile duct normal at 5 mm. Right kidney 11.4 cm with no hydronephrosis. Spleen is enlarged at 17.2 cm. Liver vessels are patent. Overall the liver appears to be fatty with an enlarged spleen being noted.  Vessels were patent. As you recall your prior ultrasound in April suggested that the spleen was also enlarged at 16.5 cm and that measurement can vary depending on how they marked.  Liver also was echogenic then as well.  The MRI that you did in 2019 was better able to show your chronic liver disease and the surface nodularity. We remain hopeful that if you can work on your weight and diet and exercise issues as we discussed that this liver imaging will continue to improve. , Oct 25 labs na 137 and k 4.5 and cl 103 and co2 22 and ca 9.5 and bun 12 and cr 0.95 and glu 197 elevated and please share with primary provider. Total protein 7.2 alb 4.5 and alk 69 and ast 32 and alt 41. TB 1.3 little up. Ideal alt is les than 35 so need to work on this. Weight gain likely added to the issues. PT 12.9 seconds and inr 1.13.  Neutrophils 4.73 and lymphs 1.55. Eosinophils 0.4 slightly up maybe due to some allergies. Wbc 7.3 and hg 15.9 and hct 45.8 and mcv 93.7 slightly up. May want to check b12 and folate levels with primary provider. Platelets 188 normal. Meld score low 9 and meld na 9 low so good to see. That was due to esophageal treatment and had lost 20 plus pounds and then gained that back,  Meds that are out now diff is ozempic and mounjaro, Need to prediabetic.diabetic and need to have a fatty liver and be overweight.  He for the dm sees primary and he says he spoke with them re looking at ozempic instead of trulicity.  April 21 2022 and wbc 7.6 hg 15.4 plat 215 and mcv 96 and neutrophils 4.6 and lymphs 1.7 and eos 0.6 elevated.  glu 65 and bun 13 and cr 0.81 and na 140 and k 4.2 nd cl 103 and co2 22 and ca 9.8 anmd alb 4.4 and 0.8 and alk 77 and ast 34 and alt 53 (prtior ast 25 and alt 55).. Iron sat 34%.  inr 1.1.   April 26: u.s body of pancreas unremarkable.  Remainder pancreas not well seen due to gas and body habitus. Liver remains echogenic but with no discrete lesion. Liver vessels patent. Common bile duct normal at 4 mm. Right kidney 11.4 cm.  Spleen still enlarged at 16.5 cm.   No mention of any free fluid in abdomen. They felt that the splenomegaly was similar to prior study.  He did the egd with ablation and that was done start of year and later for the [facility] and said and insurance issue pending for this.  December 2 2021 ultrasound shows liver to be normal in contour and diffusely increased in echogenicity but with no suspicious liver lesions. No bile duct dilation seen. Common bile duct normal at 4.5 mm. Imaged pancreas portions unremarkable with tail partially not seen due to gas. Right kidney 12.4 cm with no hydronephrosis. Spleen enlarged at 17.2 cm with no suspicious splenic lesions. Liver vessels patent. Overall liver appears to be fatty.  Prior April 2021 ultrasound showed the liver to be fatty and with an enlarged spleen.  The MRI done in 2019 saw the liver much better and showed the chronic liver disease changes and surface nodularity and advised that you had 10.9% fat.  Unfortunately u.s cannot see this as well.  October 19 labs show white blood cell count 6.2 hemoglobin 15.3 platelet count 173 MCV 94 and  neutrophils 3.9 and lymphocytes 1.4.   BUN of 14 creatinine 0.91 sodium 136 potassium 4.4 calcium 9.8 albumin 4.7 bilirubin 1.0 alk phosphatase 74 AST 25 ALT 55.  Previously AST 24 ALT 46.  Ferritin up to 280 from 186.  Iron saturation up to 52% ferritin 38.  Still in the normal range.     He has lost 25 pounds for this now.  [facility] is where he does tests with  Dr Chapa there.   When he did the tx he  lost 20 ppunds and did ivf and they saw Dr Valladares and then 1m ago and did 2nd one and did better and did pain meds in iv and nause and did better and gained some back.  He says he quit smoking and staying off that for almost a  year.  Dr Garcia removes a unit every 6m.  Sugars have been better.  April 8 2021 ultrasound shows nonvisualization of the gallbladder consistent with prior surgery.  Common bile duct normal at 4 mm.  Liver did appear to be diffusely increased in echogenicity and consistent with a possible fatty infiltration.  No focal abnormality was seen.  Imaged pancreas was unremarkable.  Right kidney 11.5 cm with no hydronephrosis.  Spleen was enlarged at 15 cm.  Liver vessels were patent.  April 2 labs showed INR 1.1 iron saturation normal at 38%.  Ferritin 186. Both down from prior labs. Sodium 140 potassium 4.0 chloride 103 CO2 22 albumin 4.5 bilirubin 1.0 alkaline phosphatase 90 AST 24 and ALT 46.  Ideal ALT would be less than 35.  Glucose elevated 135 and show to primary MD.  BUN 13.  Creatinine 0.96.  White blood cell count 9.2 hemoglobin 15.6 platelet count 202 and MCV 95 neutrophils noted to be normal at 6.4.  Per notes from last visit October 2020 labs showed hemoglobin 16.3 platelet count 179 AST 32 and ALT 62 iron sat was 41% and ferritin 269.  Meld 7 and meld na 7.   He did monday covid 19 booster and arm sore from shot. He said little tired. He did the moderna. He has not done the 2nd booster.  He is a .  He has a history of kidney stones and that has not been an issue an none x 3-4 yrs.  Oct 2020 u/s: changes of cirrhosis, and stigmata of portal hypertension seen. No focal mass in liver. Liver appears to also be fatty to them possibly. Doppler of vessels patent. Partialy visualized pancreas unremarkable.  No bile duct dilation. Gallbladder absent and common bile duct normal at 5mm. Right kidney 11cm with no hydronephrosis. Spleen enlarged 16.3cm. No ascites/fluid.  Prior 2019 scan liver fatty and nodular and spleen 19.1cm. Liver and renal cysts seen better on mri than u.s.    10-3 2020  labs: wbc 6.7 hg 16.3 and plat 179.  Glu 191 elevated and maybe not fasting and show local providers.cr 0.89.  na 139. K 4.4 and cl 103 and co2 22.  Ca 9.5. alb 4.8 and tb 0.9 and alk 85 and ast 32 and alt 62  (ideal alt less than35)  and iron sat 41% and ferritin 269.   Prior 2019 labs ast 27 and alt 51.   1/22/2019 :glu 119, creat 0.84, na 137, k 4.1, alb 4.8, carlos 2.2, t. bili 1.1, ALP 74, AST 27, ALT 51 (less than 30),  wbc 7.3, hgb 16.9, plts 206,000  7/08/2018: ALT 28, AST 18, ALP 72, t. bili 1.0  He had gained weight with stopping smoking but settling now to 202.  CT scan 01/17/2019: there are nonobstructing left renal parenchymal calculi with kidneys otherwise unremarkable, the appearance of the liver suggests diffuse fatty infiltration without focal lesion, spleen 18 x 12.0 x 7.5cm and is otherwise unrematkable, pancreas unremarkable  Encounter info: [id number], [facility], Single Visit OP, 11/3/2018 - 11/3/2018  * Final Report *  Reason For Exam *histo* abnormal liver  REPORT EXAM: MRI Abdomen w/ + w/o Contrast  CLINICAL INDICATION: *histo* abnormal liver.  TECHNIQUE: Multisequence, multiplanar MRI of the abdomen was performed without and with intravenous contrast. Written informed consent was obtained for the use of intravenous contrast.  CONTRAST: 23 cc of Prohance  COMPARISON: MR abdomen 3/10/2018  FINDINGS:  Lower Thorax: Limited images through the lung bases are unremarkable.  Liver: Morphologic changes of chronic liver disease with relative hypertrophy of the left hepatic lobe and mild surface nodularity, similar to prior. Computed fat percentage in the liver of 10.9%. No suspicious arterially enhancing lesion in the liver with washout to indicate hepatocellular carcinoma.  Gallbladder/Biliary Tree: Gallbladder not identified. No biliary ductal dilatation.  Spleen: Spleen measures 19.5 cm in length and is therefore enlarged, similar to prior.  Pancreas: No dilatation of the main pancreatic duct. Pancreatic parenchymal signal intensity is within normal limits. No suspicious focal lesion in the pancreas.  Adrenal Glands: Normal.   Kidneys/Ureters: No hydronephrosis bilaterally. No suspicious focal lesion in either kidney. The patient has a few small renal cysts measuring for example 1.0 cm upper pole right kidney series 9 image 34 and measuring 0.5 cm interpolar left kidney series 26 image 51.  Gastrointestinal: No evidence of bowel obstruction in the abdomen. Pelvic bowel loops not fully included in the field-of-view.   Lymph Nodes: No concerning lymph nodes are seen.  Vessels: Celiac trunk and superior mesenteric artery are patent. Main portal vein is patent. Mildly prominent collateral vessels are identified in the upper abdomen anteriorly.  Peritoneum/Retroperitoneum: No free fluid.  Bones/Soft Tissues: No concerning osseous lesions are seen.  IMPRESSION:      1. Similar to prior morphologic changes of chronic liver disease. No findings of hepatocellular cancer. Recommend continued 6 month MR surveillance.  2. Similar to prior sequelae of portal venous hypertension including 19 cm splenomegaly. Main portal vein is patent. No intra-abdominal ascites.   Signature Line *** Final ***  Electronically Signed By:  Mena Burnette on  11/04/2018 11:02  Plan: 1. He will keep working on his fatty lievr rf and weight loss and diet and exercise as post cabg status allows. 2. Started cardac rehab and follow. 3. Pt will do the the follow up egd with dR Chapa as overdue. 4. pt will do phlebotomy with Dr Garcia. 5. See us in 6m.   Duration of the visit was 35 min with 10 min of chart prep and 25 min for this face to face visit with  time spent on coordination of care  with the pt.

## 2024-09-24 NOTE — EXAM-PHYSICAL EXAM
Gen: awake and responsive. Eyes: anicteric, normal lids. Mouth: normal lips Nose: no drainage. Hearing: intact grossly. Neck: trachea midline and no jvd. CV: RRR no s3. Lungs: clear. No wheezes, Abd: Soft, nabs, nr, NT. Spleen trace palpable. Ext: no sig edema, some palm erythema. Neuro: moves all 4 ext grossly. No asterixis. Skin: no pruritis and some palm erythema.

## 2024-09-25 ENCOUNTER — TELEPHONE ENCOUNTER (OUTPATIENT)
Dept: URBAN - METROPOLITAN AREA CLINIC 86 | Facility: CLINIC | Age: 59
End: 2024-09-25

## 2024-09-25 NOTE — HPI-TODAY'S VISIT:
Dear Carlos Mario, September 24 ultrasound shows pancreas not well-seen due to body gas and body habitus but limited assessment unremarkable. Liver was echogenic and with no discrete lesions. Liver vessels were patent as expected. Common bile duct was normal at 4 mm. Right kidney was 11.1 cm with no hydronephrosis. Spleen was enlarged at 16.6 cm and was slightly larger than 14.6 cm previously.  No splenic mass was seen.  Splenic vessels were patent. Sometimes these measurements of the spleen to be off due to the positioning and  how they measure the size.   In summary, the liver also appeared to be echogenic/fatty infiltrated.  Patent vessels were seen and splenomegaly was still noted and they mention that it was mildly increased since the prior study and again this can vary. I would point out that in your May 2023 ultrasound the spleen had been 19.4 cm and that on one of your other ultrasounds the spleen had been 17.2 cm.  As you can see this can vary. Dr. Szymanski

## 2024-11-15 ENCOUNTER — TELEPHONE ENCOUNTER (OUTPATIENT)
Dept: URBAN - METROPOLITAN AREA CLINIC 94 | Facility: CLINIC | Age: 59
End: 2024-11-15

## 2024-11-15 RX ORDER — OMEPRAZOLE 40 MG/1
TAKE ONE CAPSULE BY MOUTH TWICE DAILY CAPSULE, DELAYED RELEASE ORAL
Qty: 180 CAPSULE | Refills: 3

## 2024-12-06 ENCOUNTER — TELEPHONE ENCOUNTER (OUTPATIENT)
Dept: URBAN - METROPOLITAN AREA MEDICAL CENTER 28 | Facility: MEDICAL CENTER | Age: 59
End: 2024-12-06

## 2024-12-06 ENCOUNTER — TELEPHONE ENCOUNTER (OUTPATIENT)
Dept: URBAN - METROPOLITAN AREA CLINIC 86 | Facility: CLINIC | Age: 59
End: 2024-12-06

## 2024-12-06 NOTE — HPI-TODAY'S VISIT:
Dear Carlos Mario, Sept 24th local labs sent to us. There were done by Dr. Norwood. Your iron saturation was low at 13% asn was a little low now. Your sugar was elevated at 115 and unclear if you were fasting.  BUN was 19 creatinine 1.1 sodium 138 potassium 4.9 calcium 9.8 albumin 4.6 bilirubin 0.8 alk phos 69 AST 22 and ALT 21 with ideal alt less than 35. WBC was 5.0 and her hemoglobin was a little low at 11.9 MCV was normal though at 89.7 and platelet count was 198.  Neutrophils are normal at 2945 and lymphocytes 1305. INR was normal at 1.1.  Ferritin was 26 which was on the low normal side.   I did see a note here from your September visit  with mention of labs in March showing iron saturation had been normal at 20% so this is gone down to 13% now.   Your hemoglobin back then had earlier on in March been 14.7 but I do see a note here that you are doing phlebotomy every 6 months with Dr. Garcia so I wonder is that causing this?  We will send a copy of this note to Dr. Garcia so that he can comment on that. Dr. Szymanski

## 2025-03-12 ENCOUNTER — TELEPHONE ENCOUNTER (OUTPATIENT)
Dept: URBAN - METROPOLITAN AREA CLINIC 86 | Facility: CLINIC | Age: 60
End: 2025-03-12

## 2025-03-12 LAB
ALBUMIN: 4.8
ALKALINE PHOSPHATASE: 103
ALT (SGPT): 35
AST (SGOT): 36
BASO (ABSOLUTE): 0
BASOS: 1
BILIRUBIN, TOTAL: 0.6
BUN/CREATININE RATIO: 14
BUN: 13
CALCIUM: 9.9
CARBON DIOXIDE, TOTAL: 22
CHLORIDE: 101
CREATININE: 0.95
EGFR: 91
EOS (ABSOLUTE): 0.4
EOS: 7
FERRITIN, SERUM: 20
GLOBULIN, TOTAL: 2.4
GLUCOSE: 195
HEMATOCRIT: 39.9
HEMATOLOGY COMMENTS:: (no result)
HEMOGLOBIN: 12.6
IMMATURE CELLS: (no result)
IMMATURE GRANS (ABS): 0
IMMATURE GRANULOCYTES: 0
INR: 1.1
IRON BIND.CAP.(TIBC): 334
IRON SATURATION: 10
IRON: 35
LYMPHS (ABSOLUTE): 1.3
LYMPHS: 22
MCH: 27.2
MCHC: 31.6
MCV: 86
MONOCYTES(ABSOLUTE): 0.3
MONOCYTES: 6
NEUTROPHILS (ABSOLUTE): 3.8
NEUTROPHILS: 64
NRBC: (no result)
PLATELETS: 191
POTASSIUM: 4.4
PROTEIN, TOTAL: 7.2
PROTHROMBIN TIME: 11.9
RBC: 4.64
RDW: 14.8
SODIUM: 137
UIBC: 299
WBC: 5.9

## 2025-03-12 NOTE — HPI-TODAY'S VISIT:
Dear Carlos Mario, The recent labs were sent to us.  The iron binding capacity 334, iron saturation slightly low at 10%.  Prior was 20%.  Ferritin 20 and this is also lower than prior 63.  INR 1.1.  White blood cells 5.9, red blood cells 4.64, hemoglobin slightly low at 12.6 and MCV 86 and platelets 199.  Glucose was 195 and this is elevated if you are fasting.  Bilirubin 0.6, alkaline phosphatase 103, AST 36 and ALT 35.  Goal for the AST and ALT is less than 35.  Curious if you recently had a phlebotomy that might explain the labs.  We will review at the follow-up and see how you are doing. Bianca Cotton PA-C

## 2025-03-16 ENCOUNTER — LAB OUTSIDE AN ENCOUNTER (OUTPATIENT)
Dept: URBAN - METROPOLITAN AREA CLINIC 86 | Facility: CLINIC | Age: 60
End: 2025-03-16

## 2025-03-24 ENCOUNTER — OFFICE VISIT (OUTPATIENT)
Dept: URBAN - METROPOLITAN AREA CLINIC 91 | Facility: CLINIC | Age: 60
End: 2025-03-24
Payer: COMMERCIAL

## 2025-03-24 ENCOUNTER — OFFICE VISIT (OUTPATIENT)
Dept: URBAN - METROPOLITAN AREA CLINIC 86 | Facility: CLINIC | Age: 60
End: 2025-03-24

## 2025-03-24 ENCOUNTER — OFFICE VISIT (OUTPATIENT)
Dept: URBAN - METROPOLITAN AREA CLINIC 86 | Facility: CLINIC | Age: 60
End: 2025-03-24
Payer: COMMERCIAL

## 2025-03-24 DIAGNOSIS — E66.3 OVERWEIGHT: ICD-10-CM

## 2025-03-24 DIAGNOSIS — N62 GYNECOMASTIA: ICD-10-CM

## 2025-03-24 DIAGNOSIS — Z71.89 VACCINE COUNSELING: ICD-10-CM

## 2025-03-24 DIAGNOSIS — K75.81 NASH (NONALCOHOLIC STEATOHEPATITIS): ICD-10-CM

## 2025-03-24 DIAGNOSIS — E83.110 HEREDITARY HEMOCHROMATOSIS: ICD-10-CM

## 2025-03-24 DIAGNOSIS — Z98.890 HISTORY OF LIVER BIOPSY: ICD-10-CM

## 2025-03-24 DIAGNOSIS — K74.60 CIRRHOSIS: ICD-10-CM

## 2025-03-24 DIAGNOSIS — K74.69 OTHER CIRRHOSIS OF LIVER: ICD-10-CM

## 2025-03-24 DIAGNOSIS — R17 ELEVATED BILIRUBIN: ICD-10-CM

## 2025-03-24 DIAGNOSIS — R93.2 ABNORMAL LIVER DIAGNOSTIC IMAGING: ICD-10-CM

## 2025-03-24 DIAGNOSIS — E11.9 TYPE 2 DIABETES MELLITUS WITHOUT COMPLICATION, WITHOUT LONG-TERM CURRENT USE OF INSULIN: ICD-10-CM

## 2025-03-24 DIAGNOSIS — Z71.6 TOBACCO ABUSE COUNSELING: ICD-10-CM

## 2025-03-24 DIAGNOSIS — R16.0 HEPATOMEGALY: ICD-10-CM

## 2025-03-24 DIAGNOSIS — Z90.49 HISTORY OF CHOLECYSTECTOMY: ICD-10-CM

## 2025-03-24 PROCEDURE — 76705 ECHO EXAM OF ABDOMEN: CPT

## 2025-03-24 PROCEDURE — 93975 VASCULAR STUDY: CPT

## 2025-03-24 PROCEDURE — 99214 OFFICE O/P EST MOD 30 MIN: CPT | Performed by: PHYSICIAN ASSISTANT

## 2025-03-24 RX ORDER — ONDANSETRON 4 MG/1
1 TABLET ON THE TONGUE AND ALLOW TO DISSOLVE TABLET, ORALLY DISINTEGRATING ORAL
Qty: 30 | Refills: 6 | Status: ON HOLD | COMMUNITY
Start: 2022-01-21

## 2025-03-24 RX ORDER — GABAPENTIN 300 MG/1
TAKE 1 CAPSULE (300 MG) BY ORAL ROUTE 3 TIMES PER DAY CAPSULE ORAL ONCE A DAY
Refills: 0 | Status: ACTIVE | COMMUNITY
Start: 1900-01-01

## 2025-03-24 RX ORDER — METOPROLOL SUCCINATE 25 MG/1
1 TABLET TABLET, FILM COATED, EXTENDED RELEASE ORAL ONCE A DAY
Status: ON HOLD | COMMUNITY

## 2025-03-24 RX ORDER — SEMAGLUTIDE 2.68 MG/ML
AS DIRECTED INJECTION, SOLUTION SUBCUTANEOUS
Status: ACTIVE | COMMUNITY

## 2025-03-24 RX ORDER — METFORMIN HYDROCHLORIDE 1000 MG/1
TAKE 1 TABLET (1,000 MG) BY ORAL ROUTE 2 TIMES PER DAY WITH MORNING AND EVENING MEALS TABLET, COATED ORAL 2
Qty: 0 | Refills: 0 | Status: ON HOLD | COMMUNITY
Start: 1900-01-01

## 2025-03-24 RX ORDER — APIXABAN 5 MG/1
AS DIRECTED TABLET, FILM COATED ORAL TWICE A DAY
Status: ON HOLD | COMMUNITY

## 2025-03-24 RX ORDER — OMEPRAZOLE 40 MG/1
TAKE ONE CAPSULE BY MOUTH TWICE DAILY CAPSULE, DELAYED RELEASE ORAL
Qty: 180 CAPSULE | Refills: 3 | Status: ACTIVE | COMMUNITY

## 2025-03-24 NOTE — HPI-TODAY'S VISIT:
Pt is a 59 year old /White male, who presents after last visit March2024  on a prior referral from Carlos Villarreal MD, for a gastroenterology evaluation of cirrhosis complicatd by portal hypertension and splenomegaly.   A copy of this document will be sent to the referring provider.  3/24/25 Dear Carlos Mario,  The recent labs were sent to us. The iron binding capacity 334, iron saturation slightly low at 10%. Prior was 20%. Ferritin 20 and this is also lower than prior 63. INR 1.1. White blood cells 5.9, red blood cells 4.64, hemoglobin slightly low at 12.6 and MCV 86 and platelets 199. Glucose was 195 and this is elevated if you are fasting. Bilirubin 0.6, alkaline phosphatase 103, AST 36 and ALT 35. Goal for the AST and ALT is less than 35. Curious if you recently had a phlebotomy that might explain the labs. We will review at the follow-up and see how you are doing.  Bianca Cotton PA-C   he was supposed to have plebotomy but last one said not neded.  US Preliom stable  he had gained weight and been out of work x 4 months  sugars up this is likely driving ast and alt  recap  Dear Carlos Mario,  Sept 24th local labs sent to us.  There were done by Dr. Norwood.  Your iron saturation was low at 13% asn was a little low now.  Your sugar was elevated at 115 and unclear if you were fasting. BUN was 19 creatinine 1.1 sodium 138 potassium 4.9 calcium 9.8 albumin 4.6 bilirubin 0.8 alk phos 69 AST 22 and ALT 21 with ideal alt less than 35.  WBC was 5.0 and her hemoglobin was a little low at 11.9 MCV was normal though at 89.7 and platelet count was 198. Neutrophils are normal at 2945 and lymphocytes 1305.  INR was normal at 1.1. Ferritin was 26 which was on the low normal side.    I did see a note here from your September visit  with mention of labs in March showing iron saturation had been normal at 20% so this is gone down to 13% now.    Your hemoglobin back then had earlier on in March been 14.7 but I do see a note here that you are doing phlebotomy every 6 months with Dr. Garcia so I wonder is that causing this? We will send a copy of this note to Dr. Garcia so that he can comment on that.  Dr. Szymanski Dear Carlos Mario,  September 24 ultrasound shows pancreas not well-seen due to body gas and body habitus but limited assessment unremarkable.  Liver was echogenic and with no discrete lesions.  Liver vessels were patent as expected.  Common bile duct was normal at 4 mm.  Right kidney was 11.1 cm with no hydronephrosis.  Spleen was enlarged at 16.6 cm and was slightly larger than 14.6 cm previously. No splenic mass was seen. Splenic vessels were patent.  Sometimes these measurements of the spleen to be off due to the positioning and  how they measure the size.   In summary, the liver also appeared to be echogenic/fatty infiltrated. Patent vessels were seen and splenomegaly was still noted and they mention that it was mildly increased since the prior study and again this can vary.  I would point out that in your May 2023 ultrasound the spleen had been 19.4 cm and that on one of your other ultrasounds the spleen had been 17.2 cm. As you can see this can vary.  Dr. Szymanski  recap  Pt open heart quad bypass and did stress test and abn and did calcium score and 2800 and they assess and did that and [place] . Dr Wm Montemayor.  Prior he had started ozempic and 183 to 184 in July with that 25 pounds. Stil melita the oempic but gaine 10 as not able as he is a  and not able. He says may get a release soon fromcardio rehab.   March 28 final ultrasound shows that the pancreas visualized portions appeared unremarkable. The liver had increased hepatic parenchymal echogenicity but no suspicious lesions. Common bile duct was normal at 3 mm. Prior cholecystectomy changes were seen. Right kidney was 11.1 cm with no hydronephrosis. Liver and splenic vessels were patent. The spleen was mildly enlarged at 14.6 cm as we discussed. In summary, they felt that the increased liver echogenicity could be due to steatosis versus chronic liver disease. No suspicious lesions were seen. Liver vessels were patent with appropriate flow. Postcholecystectomy changes were seen and splenomegaly as well. As we discussed your May 2023 ultrasound showed the liver had diffuse increased echogenicity but no lesions in the spleen was actually larger appearing than 19.4 cm which was unusually large for you compared to even the prior year at 17.2 cm. Your last MRI in 2019 had suggested that you had chronic liver disease and surface nodularity so it is possible that we may benefit from talking about doing an MRI if this does not improve any further to see this better.  March 12: iron sat 20% and normal. Ferritin 63. Glucose 229 elevated and share with local provider. Says not sure what a1c is . Has lost some weight. Bun 10 and cr 0.90 and na 136 and k 4.0 and cl 101 and co2 20 and ca 9.7 and alb 4.6 and tb 0.8 and alk 76 and ast 35 and alt 40  and while normal get alt less than 35. Wbc 6.3 and hg 14.7 and hct 43.7 and mcv 94 and platelets 170. Neutrophils 4.5 and lymphs 1.1. Inr 1.1. Meld 7 and meld na 7 so staying low. Fib 4 1.99 and so is intermediate risk score and score 2.67 high and sorres that are less than 1.3 are low.   He did egd at end of last year.  Dr Garcia is doign phlebotomy every 6m .  Nov 2023 [place] and dR Chapa rfa and he recommended a 2 m relook and not done and needs to see to get that done and need to be sure ok with the cabg.  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  Pt subsequently had radiofrequency ablation of Lopez's esophagus by Dr Chapa in 1/2022 and 3/2022. Dr Chapa had recommended repeat EGD at a 2 month interval in 3/2022.  GERD symptoms well controlled with omeprazole 40 mg BID  Denies dysphagia or abdominal pain  May 8 2023  ultrasound shows partially visualized pancreas unremarkable. Diffuse increased echogenicity of the liver parenchyma seen compatible with fatty infiltration and no focal lesions. Gallbladder surgically absent with common bile duct 7 mm which is within normal limits for post gallbladder surgery state. Right kidney 11.3 cm with no hydronephrosis. Spleen is enlarged at 19.4 cm. Last year's ultrasound suggested the spleen was 17.2 cm. No ascites seen. Liver and splenic vessels patent. In summary, the liver is fatty appearing to them but with no focal mass seen but splenomegaly was seen. Patent vessels were seen and common bile duct 7 mm which is within normal limits post gallbladder surgery.  Says calcium test was 2800 for the heart and cards started on med for this and to resee.  Started on statin now and a blood thinner.  April 14:  inr 1.1. Glucose 113 elevated and prior 65 and share with local providers. Says not sure if ate. Says a1c 6.8% and that is up. Bun 15 and cr 0.81 and na 138 and 4.4 and cl 101 and co2 21 and ca 10.0, alb 4.9 and tb 0.7 and alk 79 and ast 33 and alt 61.  Prior ast 34 and alt 53 so little higher. Maybe sugars driving this. Wbc 8.4 and hg 15.1 and platelets 198. Mcv 93. Neutrophils 4.7 and lymph 2.0. Eosinophils 1.1 elevated from 0.6 so maybe having some allergies? he has some pollen allergies.   Oct 25th 2022 ultrasound came back.   Partially visualized pancreas unremarkable. Increased echogenicity seen to the liver parenchyma consistent with fatty infiltration. No focal hepatic mass seen. Gallbladder surgically absent. Common bile duct normal at 5 mm. Right kidney 11.4 cm with no hydronephrosis. Spleen is enlarged at 17.2 cm. Liver vessels are patent. Overall the liver appears to be fatty with an enlarged spleen being noted.  Vessels were patent. As you recall your prior ultrasound in April suggested that the spleen was also enlarged at 16.5 cm and that measurement can vary depending on how they marked.  Liver also was echogenic then as well.  The MRI that you did in 2019 was better able to show your chronic liver disease and the surface nodularity. We remain hopeful that if you can work on your weight and diet and exercise issues as we discussed that this liver imaging will continue to improve. , Oct 25 labs na 137 and k 4.5 and cl 103 and co2 22 and ca 9.5 and bun 12 and cr 0.95 and glu 197 elevated and please share with primary provider. Total protein 7.2 alb 4.5 and alk 69 and ast 32 and alt 41. TB 1.3 little up. Ideal alt is les than 35 so need to work on this. Weight gain likely added to the issues. PT 12.9 seconds and inr 1.13.  Neutrophils 4.73 and lymphs 1.55. Eosinophils 0.4 slightly up maybe due to some allergies. Wbc 7.3 and hg 15.9 and hct 45.8 and mcv 93.7 slightly up. May want to check b12 and folate levels with primary provider. Platelets 188 normal. Meld score low 9 and meld na 9 low so good to see. That was due to esophageal treatment and had lost 20 plus pounds and then gained that back,  Meds that are out now diff is ozempic and mounjaro, Need to prediabetic.diabetic and need to have a fatty liver and be overweight.  He for the dm sees primary and he says he spoke with them re looking at ozempic instead of trulicity.  April 21 2022 and wbc 7.6 hg 15.4 plat 215 and mcv 96 and neutrophils 4.6 and lymphs 1.7 and eos 0.6 elevated.  glu 65 and bun 13 and cr 0.81 and na 140 and k 4.2 nd cl 103 and co2 22 and ca 9.8 anmd alb 4.4 and 0.8 and alk 77 and ast 34 and alt 53 (prtior ast 25 and alt 55).. Iron sat 34%.  inr 1.1.   April 26: u.s body of pancreas unremarkable.  Remainder pancreas not well seen due to gas and body habitus. Liver remains echogenic but with no discrete lesion. Liver vessels patent. Common bile duct normal at 4 mm. Right kidney 11.4 cm.  Spleen still enlarged at 16.5 cm.   No mention of any free fluid in abdomen. They felt that the splenomegaly was similar to prior study.  He did the egd with ablation and that was done start of year and later for the [facility] and said and insurance issue pending for this.  December 2 2021 ultrasound shows liver to be normal in contour and diffusely increased in echogenicity but with no suspicious liver lesions. No bile duct dilation seen. Common bile duct normal at 4.5 mm. Imaged pancreas portions unremarkable with tail partially not seen due to gas. Right kidney 12.4 cm with no hydronephrosis. Spleen enlarged at 17.2 cm with no suspicious splenic lesions. Liver vessels patent. Overall liver appears to be fatty.  Prior April 2021 ultrasound showed the liver to be fatty and with an enlarged spleen.  The MRI done in 2019 saw the liver much better and showed the chronic liver disease changes and surface nodularity and advised that you had 10.9% fat.  Unfortunately u.s cannot see this as well.  October 19 labs show white blood cell count 6.2 hemoglobin 15.3 platelet count 173 MCV 94 and  neutrophils 3.9 and lymphocytes 1.4.   BUN of 14 creatinine 0.91 sodium 136 potassium 4.4 calcium 9.8 albumin 4.7 bilirubin 1.0 alk phosphatase 74 AST 25 ALT 55.  Previously AST 24 ALT 46.  Ferritin up to 280 from 186.  Iron saturation up to 52% ferritin 38.  Still in the normal range.     He has lost 25 pounds for this now.  [facility] is where he does tests with  Dr Chapa there.   When he did the tx he  lost 20 ppunds and did ivf and they saw Dr Valladares and then 1m ago and did 2nd one and did better and did pain meds in iv and nause and did better and gained some back.  He says he quit smoking and staying off that for almost a  year.  Dr Garcia removes a unit every 6m.  Sugars have been better.  April 8 2021 ultrasound shows nonvisualization of the gallbladder consistent with prior surgery.  Common bile duct normal at 4 mm.  Liver did appear to be diffusely increased in echogenicity and consistent with a possible fatty infiltration.  No focal abnormality was seen.  Imaged pancreas was unremarkable.  Right kidney 11.5 cm with no hydronephrosis.  Spleen was enlarged at 15 cm.  Liver vessels were patent.  April 2 labs showed INR 1.1 iron saturation normal at 38%.  Ferritin 186. Both down from prior labs. Sodium 140 potassium 4.0 chloride 103 CO2 22 albumin 4.5 bilirubin 1.0 alkaline phosphatase 90 AST 24 and ALT 46.  Ideal ALT would be less than 35.  Glucose elevated 135 and show to primary MD.  BUN 13.  Creatinine 0.96.  White blood cell count 9.2 hemoglobin 15.6 platelet count 202 and MCV 95 neutrophils noted to be normal at 6.4.  Per notes from last visit October 2020 labs showed hemoglobin 16.3 platelet count 179 AST 32 and ALT 62 iron sat was 41% and ferritin 269.  Meld 7 and meld na 7.   He did monday covid 19 booster and arm sore from shot. He said little tired. He did the moderna. He has not done the 2nd booster.  He is a .  He has a history of kidney stones and that has not been an issue an none x 3-4 yrs.  Oct 2020 u/s: changes of cirrhosis, and stigmata of portal hypertension seen. No focal mass in liver. Liver appears to also be fatty to them possibly. Doppler of vessels patent. Partialy visualized pancreas unremarkable.  No bile duct dilation. Gallbladder absent and common bile duct normal at 5mm. Right kidney 11cm with no hydronephrosis. Spleen enlarged 16.3cm. No ascites/fluid.  Prior 2019 scan liver fatty and nodular and spleen 19.1cm. Liver and renal cysts seen better on mri than u.s.    10-3 2020  labs: wbc 6.7 hg 16.3 and plat 179.  Glu 191 elevated and maybe not fasting and show local providers.cr 0.89.  na 139. K 4.4 and cl 103 and co2 22.  Ca 9.5. alb 4.8 and tb 0.9 and alk 85 and ast 32 and alt 62  (ideal alt less than35)  and iron sat 41% and ferritin 269.   Prior 2019 labs ast 27 and alt 51.   1/22/2019 :glu 119, creat 0.84, na 137, k 4.1, alb 4.8, carlos 2.2, t. bili 1.1, ALP 74, AST 27, ALT 51 (less than 30),  wbc 7.3, hgb 16.9, plts 206,000  7/08/2018: ALT 28, AST 18, ALP 72, t. bili 1.0  He had gained weight with stopping smoking but settling now to 202.  CT scan 01/17/2019: there are nonobstructing left renal parenchymal calculi with kidneys otherwise unremarkable, the appearance of the liver suggests diffuse fatty infiltration without focal lesion, spleen 18 x 12.0 x 7.5cm and is otherwise unrematkable, pancreas unremarkable  Encounter info: [id number], [facility], Single Visit OP, 11/3/2018 - 11/3/2018  * Final Report *  Reason For Exam *histo* abnormal liver  REPORT EXAM: MRI Abdomen w/ + w/o Contrast  CLINICAL INDICATION: *histo* abnormal liver.  TECHNIQUE: Multisequence, multiplanar MRI of the abdomen was performed without and with intravenous contrast. Written informed consent was obtained for the use of intravenous contrast.  CONTRAST: 23 cc of Prohance  COMPARISON: MR abdomen 3/10/2018  FINDINGS:  Lower Thorax: Limited images through the lung bases are unremarkable.  Liver: Morphologic changes of chronic liver disease with relative hypertrophy of the left hepatic lobe and mild surface nodularity, similar to prior. Computed fat percentage in the liver of 10.9%. No suspicious arterially enhancing lesion in the liver with washout to indicate hepatocellular carcinoma.  Gallbladder/Biliary Tree: Gallbladder not identified. No biliary ductal dilatation.  Spleen: Spleen measures 19.5 cm in length and is therefore enlarged, similar to prior.  Pancreas: No dilatation of the main pancreatic duct. Pancreatic parenchymal signal intensity is within normal limits. No suspicious focal lesion in the pancreas.  Adrenal Glands: Normal.   Kidneys/Ureters: No hydronephrosis bilaterally. No suspicious focal lesion in either kidney. The patient has a few small renal cysts measuring for example 1.0 cm upper pole right kidney series 9 image 34 and measuring 0.5 cm interpolar left kidney series 26 image 51.  Gastrointestinal: No evidence of bowel obstruction in the abdomen. Pelvic bowel loops not fully included in the field-of-view.   Lymph Nodes: No concerning lymph nodes are seen.  Vessels: Celiac trunk and superior mesenteric artery are patent. Main portal vein is patent. Mildly prominent collateral vessels are identified in the upper abdomen anteriorly.  Peritoneum/Retroperitoneum: No free fluid.  Bones/Soft Tissues: No concerning osseous lesions are seen.  IMPRESSION:      1. Similar to prior morphologic changes of chronic liver disease. No findings of hepatocellular cancer. Recommend continued 6 month MR surveillance.  2. Similar to prior sequelae of portal venous hypertension including 19 cm splenomegaly. Main portal vein is patent. No intra-abdominal ascites.   Signature Line *** Final ***  Electronically Signed By:  Mena Burnette on  11/04/2018 11:02

## 2025-03-25 ENCOUNTER — TELEPHONE ENCOUNTER (OUTPATIENT)
Dept: URBAN - METROPOLITAN AREA CLINIC 86 | Facility: CLINIC | Age: 60
End: 2025-03-25

## 2025-03-25 NOTE — HPI-TODAY'S VISIT:
Carlos Mario,  Was sent to me.  The liver was normal in size and they noted it was mildly echogenic which can be seen with fatty liver but stable to prior exam.  They noted it was somewhat heterogeneous as well which is seen in chronic liver disease.  The gallbladder surgically absent.  Common bile duct was normal.  Right kidney appeared normal.  The spleen about 16.5 cm and they noted that this was stable compared to the prior exam.  Happy to see this is stable.  Overall they noted this was stable ultrasound and they did not see any suspicious lesions. Bianca Cotton PA-C

## 2025-05-07 ENCOUNTER — OFFICE VISIT (OUTPATIENT)
Dept: URBAN - METROPOLITAN AREA CLINIC 92 | Facility: CLINIC | Age: 60
End: 2025-05-07

## 2025-06-04 ENCOUNTER — OFFICE VISIT (OUTPATIENT)
Dept: URBAN - METROPOLITAN AREA CLINIC 92 | Facility: CLINIC | Age: 60
End: 2025-06-04
Payer: COMMERCIAL

## 2025-06-04 DIAGNOSIS — K22.711 BARRETT'S ESOPHAGUS WITH HIGH GRADE DYSPLASIA: ICD-10-CM

## 2025-06-04 DIAGNOSIS — K74.69 OTHER CIRRHOSIS OF LIVER: ICD-10-CM

## 2025-06-04 PROCEDURE — 99213 OFFICE O/P EST LOW 20 MIN: CPT | Performed by: INTERNAL MEDICINE

## 2025-06-04 RX ORDER — ONDANSETRON 4 MG/1
1 TABLET ON THE TONGUE AND ALLOW TO DISSOLVE TABLET, ORALLY DISINTEGRATING ORAL
Qty: 30 | Refills: 6 | Status: DISCONTINUED | COMMUNITY
Start: 2022-01-21

## 2025-06-04 RX ORDER — APIXABAN 5 MG/1
AS DIRECTED TABLET, FILM COATED ORAL TWICE A DAY
Status: DISCONTINUED | COMMUNITY

## 2025-06-04 RX ORDER — SEMAGLUTIDE 2.68 MG/ML
AS DIRECTED INJECTION, SOLUTION SUBCUTANEOUS
Status: ACTIVE | COMMUNITY

## 2025-06-04 RX ORDER — GABAPENTIN 300 MG/1
TAKE 1 CAPSULE (300 MG) BY ORAL ROUTE 3 TIMES PER DAY CAPSULE ORAL ONCE A DAY
Refills: 0 | Status: ACTIVE | COMMUNITY
Start: 1900-01-01

## 2025-06-04 RX ORDER — METFORMIN HYDROCHLORIDE 1000 MG/1
TAKE 1 TABLET (1,000 MG) BY ORAL ROUTE 2 TIMES PER DAY WITH MORNING AND EVENING MEALS TABLET, COATED ORAL 2
Qty: 0 | Refills: 0 | Status: DISCONTINUED | COMMUNITY
Start: 1900-01-01

## 2025-06-04 RX ORDER — METOPROLOL SUCCINATE 25 MG/1
1 TABLET TABLET, FILM COATED, EXTENDED RELEASE ORAL ONCE A DAY
Status: DISCONTINUED | COMMUNITY

## 2025-06-04 RX ORDER — OMEPRAZOLE 40 MG/1
TAKE ONE CAPSULE BY MOUTH TWICE DAILY CAPSULE, DELAYED RELEASE ORAL
Qty: 180 CAPSULE | Refills: 3 | Status: ACTIVE | COMMUNITY

## 2025-06-04 NOTE — HPI-TODAY'S VISIT:
This is a 59-year-old male presents today for follow-up.  I last saw him a couple years ago.  We had completed his EGD with RFA.  He did not follow-up for surveillance endoscopy to confirm eradication.  He is in his usual state of health and has no particular complaints at this time.  Of note since our last encounter he did have a coronary bypass he states he is doing well and has been cleared by his cardiologist.

## 2025-06-05 ENCOUNTER — TELEPHONE ENCOUNTER (OUTPATIENT)
Dept: URBAN - METROPOLITAN AREA MEDICAL CENTER 28 | Facility: MEDICAL CENTER | Age: 60
End: 2025-06-05

## 2025-07-10 ENCOUNTER — TELEPHONE ENCOUNTER (OUTPATIENT)
Dept: URBAN - METROPOLITAN AREA MEDICAL CENTER 28 | Facility: MEDICAL CENTER | Age: 60
End: 2025-07-10

## 2025-07-24 ENCOUNTER — LAB OUTSIDE AN ENCOUNTER (OUTPATIENT)
Dept: URBAN - METROPOLITAN AREA CLINIC 92 | Facility: CLINIC | Age: 60
End: 2025-07-24

## 2025-07-24 ENCOUNTER — OFFICE VISIT (OUTPATIENT)
Dept: URBAN - METROPOLITAN AREA MEDICAL CENTER 28 | Facility: MEDICAL CENTER | Age: 60
End: 2025-07-24
Payer: COMMERCIAL

## 2025-07-24 ENCOUNTER — TELEPHONE ENCOUNTER (OUTPATIENT)
Dept: URBAN - METROPOLITAN AREA CLINIC 92 | Facility: CLINIC | Age: 60
End: 2025-07-24

## 2025-07-24 DIAGNOSIS — K22.89 OTHER SPECIFIED DISEASE OF ESOPHAGUS: ICD-10-CM

## 2025-07-24 DIAGNOSIS — K22.711 BARRETT'S ESOPHAGUS WITH HIGH GRADE DYSPLASIA: ICD-10-CM

## 2025-07-24 LAB
GLUCOSE POC: 141
PERFORMING LAB: (no result)

## 2025-07-24 PROCEDURE — 43270 EGD LESION ABLATION: CPT | Performed by: INTERNAL MEDICINE

## 2025-07-24 RX ORDER — SEMAGLUTIDE 2.68 MG/ML
AS DIRECTED INJECTION, SOLUTION SUBCUTANEOUS
Status: ACTIVE | COMMUNITY

## 2025-07-24 RX ORDER — OMEPRAZOLE 40 MG/1
TAKE ONE CAPSULE BY MOUTH TWICE DAILY CAPSULE, DELAYED RELEASE ORAL
Qty: 180 CAPSULE | Refills: 3 | Status: ACTIVE | COMMUNITY

## 2025-07-24 RX ORDER — GABAPENTIN 300 MG/1
TAKE 1 CAPSULE (300 MG) BY ORAL ROUTE 3 TIMES PER DAY CAPSULE ORAL ONCE A DAY
Refills: 0 | Status: ACTIVE | COMMUNITY
Start: 1900-01-01